# Patient Record
Sex: FEMALE | Race: NATIVE HAWAIIAN OR OTHER PACIFIC ISLANDER | NOT HISPANIC OR LATINO | Employment: OTHER | URBAN - NONMETROPOLITAN AREA
[De-identification: names, ages, dates, MRNs, and addresses within clinical notes are randomized per-mention and may not be internally consistent; named-entity substitution may affect disease eponyms.]

---

## 2017-03-05 PROBLEM — I16.0 HYPERTENSIVE URGENCY: Status: ACTIVE | Noted: 2017-03-05

## 2017-03-05 PROBLEM — J81.0 FLASH PULMONARY EDEMA (HCC): Status: ACTIVE | Noted: 2017-03-05

## 2017-03-05 PROBLEM — R09.02 HYPOXEMIA: Status: ACTIVE | Noted: 2017-03-05

## 2017-05-23 ENCOUNTER — OFFICE VISIT (OUTPATIENT)
Dept: CARDIOLOGY | Facility: CLINIC | Age: 77
End: 2017-05-23
Payer: COMMERCIAL

## 2017-05-23 VITALS
HEART RATE: 95 BPM | BODY MASS INDEX: 22.45 KG/M2 | OXYGEN SATURATION: 95 % | SYSTOLIC BLOOD PRESSURE: 102 MMHG | WEIGHT: 122 LBS | DIASTOLIC BLOOD PRESSURE: 70 MMHG | HEIGHT: 62 IN

## 2017-05-23 DIAGNOSIS — I10 ESSENTIAL HYPERTENSION: ICD-10-CM

## 2017-05-23 DIAGNOSIS — G47.30 SLEEP APNEA, UNSPECIFIED TYPE: ICD-10-CM

## 2017-05-23 DIAGNOSIS — I48.0 PAROXYSMAL ATRIAL FIBRILLATION (HCC): ICD-10-CM

## 2017-05-23 DIAGNOSIS — R00.2 PALPITATIONS: ICD-10-CM

## 2017-05-23 DIAGNOSIS — Z79.01 CHRONIC ANTICOAGULATION: ICD-10-CM

## 2017-05-23 DIAGNOSIS — I65.22 CAROTID OCCLUSION, LEFT: ICD-10-CM

## 2017-05-23 PROCEDURE — 1101F PT FALLS ASSESS-DOCD LE1/YR: CPT | Performed by: INTERNAL MEDICINE

## 2017-05-23 PROCEDURE — 1036F TOBACCO NON-USER: CPT | Performed by: INTERNAL MEDICINE

## 2017-05-23 PROCEDURE — 99214 OFFICE O/P EST MOD 30 MIN: CPT | Performed by: INTERNAL MEDICINE

## 2017-05-23 PROCEDURE — G8432 DEP SCR NOT DOC, RNG: HCPCS | Performed by: INTERNAL MEDICINE

## 2017-05-23 PROCEDURE — 4040F PNEUMOC VAC/ADMIN/RCVD: CPT | Performed by: INTERNAL MEDICINE

## 2017-05-23 PROCEDURE — G8598 ASA/ANTIPLAT THER USED: HCPCS | Performed by: INTERNAL MEDICINE

## 2017-05-23 PROCEDURE — G8420 CALC BMI NORM PARAMETERS: HCPCS | Performed by: INTERNAL MEDICINE

## 2017-05-23 NOTE — MR AVS SNAPSHOT
"Kaia Rea   2017 12:40 PM   Office Visit   MRN: [id number]    Department:  [facility]   Dept Phone:  [phone]    Description:  Female : 1940   Provider:  Adelita Harris MD,[facility]           Reason for Visit     Follow-Up           Allergies as of 2017     Allergen Noted Reactions    Ciprofloxacin 2017   Palpitations    Nausea and dizziness    Seasonal 10/31/2016         You were diagnosed with     Essential hypertension   [5133004]       Paroxysmal atrial fibrillation (CMS-HCC)   [297220]       Chronic anticoagulation   [776667]       Palpitations   [785.1.ICD-9-CM]       Carotid occlusion, left   [007681]       Sleep apnea, unspecified type   [3142671]         Vital Signs     Blood Pressure Pulse Height Weight Body Mass Index Oxygen Saturation    102/70 mmHg 95 1.575 m (5' 2\") 55.339 kg (122 lb) 22.31 kg/m2 95%    Smoking Status                   Former Smoker           Basic Information     Date Of Birth Sex Race Ethnicity Preferred Language    1940 Female  or other  Non- English      Your appointments     2017 10:00 AM   30 Minute with SARI Sanchez   [facility] (--)    [address] [place] [id number] [phone]            Sep 12, 2017  2:20 PM   FOLLOW UP with [facility]elita Harris MD,[facility]    [address] [address]  2nd Floor  [facility] [id number] [phone]              Problem List              ICD-10-CM Priority Class Noted - Resolved    Sleep apnea (Chronic) G47.30   2012 - Present    Hyperlipidemia (Chronic) E78.5   2012 - Present    Chronic low back pain (Chronic) M54.5, G89.29   2012 - Present    Tobacco abuse (Chronic) Z72.0   2012 - Present    Osteopenia (Chronic) M85.80   2012 - Present    Vitamin d deficiency    2012 - Present    History of positive PPD " (Chronic) R76.11   8/22/2012 - Present    Depression F32.9   8/22/2012 - Present    Vaginitis N76.0   9/17/2014 - Present    Atrial fibrillation (CMS-HCC) I48.91 High  12/11/2014 - Present    Palpitations R00.2 High  12/11/2014 - Present    Stroke (CMS-HCC) I63.9   3/16/2016 - Present    Impaired mobility and ADLs Z74.09   3/16/2016 - Present    Aphasia R47.01   3/16/2016 - Present    Benign essential HTN I10   8/10/2016 - Present    Anticoagulated Z79.01   8/10/2016 - Present    Hypoxemia R09.02   3/5/2017 - Present    Flash pulmonary edema (CMS-HCC) J81.0   3/5/2017 - Present    Hypertensive urgency I16.0   3/5/2017 - Present      Health Maintenance        Date Due Completion Dates    IMM DTaP/Tdap/Td Vaccine (1 - Tdap) 8/15/1959 ---    PAP SMEAR 8/15/1961 ---    IMM ZOSTER VACCINE 8/15/2000 ---    IMM PNEUMOCOCCAL 65+ (ADULT) LOW/MEDIUM RISK SERIES (2 of 2 - PCV13) 2/12/2017 2/12/2016, 5/6/2011    MAMMOGRAM 11/14/2017 11/14/2016 (Declined), 10/29/2015, 10/22/2013 (Declined), 7/6/2011 (Done)    Override on 11/14/2016: Patient Declined    Override on 10/22/2013: Patient Declined    Override on 7/6/2011: Done [place] [place]    BONE DENSITY 11/5/2020 11/5/2015, 10/22/2013 (Declined), 7/13/2011 (Done)    Override on 10/22/2013: Patient Declined    Override on 7/13/2011: Done ([facility], Dr Bartlett, osteopenia, no longer osteoporosis)    COLONOSCOPY 10/22/2023 10/22/2013 (Declined), 3/4/2013 (Declined)    Override on 10/22/2013: Patient Declined    Override on 3/4/2013: Patient Declined            Current Immunizations     Influenza TIV (IM) 10/22/2013, 10/27/2011    Influenza Vaccine Adult HD 10/31/2016  2:44 PM    Influenza Vaccine Quad Inj (Preserved) 10/5/2015    Pneumococcal polysaccharide vaccine (PPSV-23) 2/12/2016, 5/6/2011      Below and/or attached are the medications your provider expects you to take. Review all of your home medications and newly ordered medications with your provider and/or pharmacist.  Follow medication instructions as directed by your provider and/or pharmacist. Please keep your medication list with you and share with your provider. Update the information when medications are discontinued, doses are changed, or new medications (including over-the-counter products) are added; and carry medication information at all times in the event of emergency situations     Allergies:  CIPROFLOXACIN - Palpitations     SEASONAL - (reactions not documented)               Medications  Valid as of: May 23, 2017 -  1:03 PM    Generic Name Brand Name Tablet Size Instructions for use    Alendronate Sodium (Tab) FOSAMAX 70 MG Take 1 Tab by mouth every 7 days.        AmLODIPine Besylate (Tab) NORVASC 10 MG Take 1 Tab by mouth every day.        Apixaban (Tab) ELIQUIS 5mg Take 1 Tab by mouth 2 Times a Day.        Aspirin (Tablet Delayed Response) aspirin 81 MG Take 1 Tab by mouth every day.        Ciprofloxacin HCl (Tab) CIPRO 500 MG Take 1 Tab by mouth 2 times a day.        Furosemide (Tab) LASIX 20 MG Take 1 Tab by mouth every day.        Lisinopril (Tab) PRINIVIL, ZESTRIL 40 MG Take 1 Tab by mouth every day.        Metoprolol Tartrate (Tab) LOPRESSOR 25 MG Take 1 Tab by mouth 2 times a day.        Multiple Vitamins-Minerals   Take 1 Tab by mouth.        Pantoprazole Sodium (Tablet Delayed Response) PROTONIX 40 MG TAKE ONE TABLET BY MOUTH DAILY        Potassium Chloride Elisa CR (Tab CR) Kdur 20 MEQ Take 1 Tab by mouth 2 times a day.        Simvastatin (Tab) ZOCOR 5 MG Take 1 Tab by mouth every evening.        Sulfamethoxazole-Trimethoprim (Tab) BACTRIM 400-80 MG Take 1 Tab by mouth 2 times a day.        .                 Medicines prescribed today were sent to:     [facility][id number] - [place], [place] - [address]    [address] [facility] [address]    Phone: [phone] Fax: [phone]    Open 24 Hours?: No      Medication refill instructions:       If your prescription bottle indicates you have  medication refills left, it is not necessary to call your provider’s office. Please contact your pharmacy and they will refill your medication.    If your prescription bottle indicates you do not have any refills left, you may request refills at any time through one of the following ways: The online [employer] system (except Urgent Care), by calling your provider’s office, or by asking your pharmacy to contact your provider’s office with a refill request. Medication refills are processed only during regular business hours and may not be available until the next business day. Your provider may request additional information or to have a follow-up visit with you prior to refilling your medication.   *Please Note: Medication refills are assigned a new Rx number when refilled electronically. Your pharmacy may indicate that no refills were authorized even though a new prescription for the same medication is available at the pharmacy. Please request the medicine by name with the pharmacy before contacting your provider for a refill.           Book[employer] Access Code: Activation code not generated  Current Book[employer] Status: Active

## 2017-05-23 NOTE — Clinical Note
[facility] [address],   2nd Floor  [place] [address]  Phone: [phone]  Fax: [phone]              Kaia Rea  1940    Encounter Date: 5/23/2017    ALEAH Sanchez.    Thank you for the referral. I had the pleasure of seeing Kaia Rea today in cardiology clinic. I've attached my visit note below. If you have any questions please feel free to give me a call anytime.      Adelita Harris MD, PhD, [facility]  Cardiology and Lipidology  [facility]                                                                  PROGRESS NOTE:  Chief Complaint   Patient presents with   • Follow-Up       This patient is an established female who is here today to discuss:  Recent hospitalized for HTN urgency and increased Lisinopril to 40 mg/d; No new compallnts now; Occ palpitation; not certain if still PAF    Patient Active Problem List    Diagnosis Date Noted   • Atrial fibrillation (CMS-HCC) 12/11/2014     Priority: High   • Palpitations 12/11/2014     Priority: High   • Hypoxemia 03/05/2017   • Flash pulmonary edema (CMS-HCC) 03/05/2017   • Hypertensive urgency 03/05/2017   • Benign essential HTN 08/10/2016   • Anticoagulated 08/10/2016   • Stroke (CMS-HCC) 03/16/2016   • Impaired mobility and ADLs 03/16/2016   • Aphasia 03/16/2016   • Vaginitis 09/17/2014   • Sleep apnea 08/22/2012   • Hyperlipidemia 08/22/2012   • Chronic low back pain 08/22/2012   • Tobacco abuse 08/22/2012   • Osteopenia 08/22/2012   • Vitamin d deficiency 08/22/2012   • History of positive PPD 08/22/2012   • Depression 08/22/2012       Past Medical History   Diagnosis Date   • HTN (hypertension) 8/22/2012   • Hyperlipidemia 8/22/2012   • Chronic low back pain 8/22/2012   • Tobacco abuse 8/22/2012   • Osteopenia 8/22/2012   • Vitamin d deficiency 8/22/2012   • History of positive PPD 8/22/2012   • Depression 8/22/2012   •  Hypertension    • Atrial fibrillation (CMS-HCC) 12/11/2014   • Sleep apnea 8/22/2012     No CPAP since 04/2016   • Hemorrhagic disorder      on Eliquis   • Stroke (CMS-HCC) 03/2016   • Left carotid artery occlusion 10/10/2016     Past Surgical History   Procedure Laterality Date   • Appendectomy  1987   • Cholecystectomy  1991   • Abdominal hysterectomy total  1962     partial hysterectomy secondary to prolapse   • Other orthopedic surgery  02/2016     right hip replacement    • Recovery  7/21/2016     Procedure: CATH LAB MISTI WITH ANESTHESIA DR. FOY;  Surgeon: Recoveryonly Surgery;  [place] [facility] [place]:        Current Outpatient Prescriptions   Medication Sig Dispense Refill   • lisinopril (PRINIVIL, ZESTRIL) 40 MG tablet Take 1 Tab by mouth every day. 90 Tab 0   • simvastatin (ZOCOR) 5 MG Tab Take 1 Tab by mouth every evening. 30 Tab 11   • apixaban (ELIQUIS) 5mg Tab Take 1 Tab by mouth 2 Times a Day. 180 Tab 0   • amlodipine (NORVASC) 10 MG Tab Take 1 Tab by mouth every day. 30 Tab 0   • furosemide (LASIX) 20 MG Tab Take 1 Tab by mouth every day. 30 Tab 0   • potassium chloride SA (KDUR) 20 MEQ Tab CR Take 1 Tab by mouth 2 times a day. 30 Tab 0   • Multiple Vitamins-Minerals (MULTI ADULT GUMMIES PO) Take 1 Tab by mouth.     • pantoprazole (PROTONIX) 40 MG Tablet Delayed Response TAKE ONE TABLET BY MOUTH DAILY 90 Tab 3   • alendronate (FOSAMAX) 70 MG Tab Take 1 Tab by mouth every 7 days. 4 Tab 3   • aspirin EC 81 MG EC tablet Take 1 Tab by mouth every day. 30 Tab 0   • sulfamethoxazole-trimethoprim (BACTRIM) 400-80 MG Tab Take 1 Tab by mouth 2 times a day. 6 Tab 0   • ciprofloxacin (CIPRO) 500 MG Tab Take 1 Tab by mouth 2 times a day. 10 Tab 0     No current facility-administered medications for this visit.     Ciprofloxacin and Seasonal      Review of Systems:     Constitutional: Denies fevers, Denies weight changes  Eyes: Denies changes in vision, no eye  "pain  Ears/Nose/Throat/Mouth: Denies nasal congestion or sore throat   Cardiovascular: Denies chest pain BUT palpitations   Respiratory: Denies shortness of breath , Denies cough  Gastrointestinal/Hepatic: Denies abdominal pain, nausea, vomiting, diarrhea, constipation or GI bleeding   Genitourinary: Denies bladder dysfunction, dysuria or frequency  Musculoskeletal/Rheum: Denies  joint pain and swelling   Skin/Breast: Denies rash, denies breast lumps or discharge  Neurological: Denies headache, confusion, memory loss or focal weakness/parasthesias  Psychiatric: denies mood disorder   Endocrine: denies hx of diabetes or thyroid dysfunction  Heme/Oncology/Lymph Nodes: Denies enlarged lymph nodes, denies brusing or known bleeding disorder  Allergic/Immunologic:  hx of allergies      All other systems were reviewed and are negative (AMA/CMS criteria)      Blood pressure 102/70, pulse 95, height 1.575 m (5' 2\"), weight 55.339 kg (122 lb), SpO2 95 %.  Gen: Alert and oriented, No apparent distress.    HEENT: Perrla, TM clear, Oralpharynx no erythema or exudates.    Neck: No Jugular venous distension, Lymphadenopathy, Thyromegaly, Bruits.    Lungs: Clear to auscultation on left lung field and some crackles on Rt    CV: RRegular rate and rhythm. No murmurs,HJR, rubs or gallops.    Abd: Soft non tender, non distended. Normal active bowel sounds.No Hepatosplenomegaly, No pulsatile masses.    Ext: No clubbing, cyanosis, edema.Difficult to walk    MISTI: CONCLUSIONS  No evidence of PFO or ASD by color flow and agitated saline study.  Normal transesophageal echocardiogram.     Assessment and Plan.   76 y.o. female has occ palpitation still scares her a little; sitting up then lay back down seems to help; Long discussion about AF ablation; She wants to try Lopressor first;     1. Essential hypertension  controlled    2. Paroxysmal atrial fibrillation (CMS-HCC)  Still some palpitation    3. Chronic anticoagulation  On Eliquis    4. " Palpitations  See above    5. Carotid occlusion, left  Left    6. Sleep apnea, unspecified type  recheck      1. Essential hypertension     2. Paroxysmal atrial fibrillation (CMS-HCC)     3. Chronic anticoagulation     4. Palpitations     5. Carotid occlusion, left     6. Sleep apnea, unspecified type               Lizzeth Baca, AMRIT.P.R.N.  [address] [facility] [address]  VIA In Basket

## 2017-05-23 NOTE — PROGRESS NOTES
Chief Complaint   Patient presents with   • Follow-Up       This patient is an established female who is here today to discuss:  Recent hospitalized for HTN urgency and increased Lisinopril to 40 mg/d; No new compallnts now; Occ palpitation; not certain if still PAF    Patient Active Problem List    Diagnosis Date Noted   • Atrial fibrillation (CMS-HCC) 12/11/2014     Priority: High   • Palpitations 12/11/2014     Priority: High   • Hypoxemia 03/05/2017   • Flash pulmonary edema (CMS-HCC) 03/05/2017   • Hypertensive urgency 03/05/2017   • Benign essential HTN 08/10/2016   • Anticoagulated 08/10/2016   • Stroke (CMS-HCC) 03/16/2016   • Impaired mobility and ADLs 03/16/2016   • Aphasia 03/16/2016   • Vaginitis 09/17/2014   • Sleep apnea 08/22/2012   • Hyperlipidemia 08/22/2012   • Chronic low back pain 08/22/2012   • Tobacco abuse 08/22/2012   • Osteopenia 08/22/2012   • Vitamin d deficiency 08/22/2012   • History of positive PPD 08/22/2012   • Depression 08/22/2012       Past Medical History   Diagnosis Date   • HTN (hypertension) 8/22/2012   • Hyperlipidemia 8/22/2012   • Chronic low back pain 8/22/2012   • Tobacco abuse 8/22/2012   • Osteopenia 8/22/2012   • Vitamin d deficiency 8/22/2012   • History of positive PPD 8/22/2012   • Depression 8/22/2012   • Hypertension    • Atrial fibrillation (CMS-HCC) 12/11/2014   • Sleep apnea 8/22/2012     No CPAP since 04/2016   • Hemorrhagic disorder      on Eliquis   • Stroke (CMS-HCC) 03/2016   • Left carotid artery occlusion 10/10/2016     Past Surgical History   Procedure Laterality Date   • Appendectomy  1987   • Cholecystectomy  1991   • Abdominal hysterectomy total  1962     partial hysterectomy secondary to prolapse   • Other orthopedic surgery  02/2016     right hip replacement    • Recovery  7/21/2016     Procedure: CATH LAB MISTI WITH ANESTHESIA DR. FOY;  Surgeon: Recoveryonly Surgery;  [place] [facility] [place]  Service:        Current Outpatient  Prescriptions   Medication Sig Dispense Refill   • lisinopril (PRINIVIL, ZESTRIL) 40 MG tablet Take 1 Tab by mouth every day. 90 Tab 0   • simvastatin (ZOCOR) 5 MG Tab Take 1 Tab by mouth every evening. 30 Tab 11   • apixaban (ELIQUIS) 5mg Tab Take 1 Tab by mouth 2 Times a Day. 180 Tab 0   • amlodipine (NORVASC) 10 MG Tab Take 1 Tab by mouth every day. 30 Tab 0   • furosemide (LASIX) 20 MG Tab Take 1 Tab by mouth every day. 30 Tab 0   • potassium chloride SA (KDUR) 20 MEQ Tab CR Take 1 Tab by mouth 2 times a day. 30 Tab 0   • Multiple Vitamins-Minerals (MULTI ADULT GUMMIES PO) Take 1 Tab by mouth.     • pantoprazole (PROTONIX) 40 MG Tablet Delayed Response TAKE ONE TABLET BY MOUTH DAILY 90 Tab 3   • alendronate (FOSAMAX) 70 MG Tab Take 1 Tab by mouth every 7 days. 4 Tab 3   • aspirin EC 81 MG EC tablet Take 1 Tab by mouth every day. 30 Tab 0   • sulfamethoxazole-trimethoprim (BACTRIM) 400-80 MG Tab Take 1 Tab by mouth 2 times a day. 6 Tab 0   • ciprofloxacin (CIPRO) 500 MG Tab Take 1 Tab by mouth 2 times a day. 10 Tab 0     No current facility-administered medications for this visit.     Ciprofloxacin and Seasonal      Review of Systems:     Constitutional: Denies fevers, Denies weight changes  Eyes: Denies changes in vision, no eye pain  Ears/Nose/Throat/Mouth: Denies nasal congestion or sore throat   Cardiovascular: Denies chest pain BUT palpitations   Respiratory: Denies shortness of breath , Denies cough  Gastrointestinal/Hepatic: Denies abdominal pain, nausea, vomiting, diarrhea, constipation or GI bleeding   Genitourinary: Denies bladder dysfunction, dysuria or frequency  Musculoskeletal/Rheum: Denies  joint pain and swelling   Skin/Breast: Denies rash, denies breast lumps or discharge  Neurological: Denies headache, confusion, memory loss or focal weakness/parasthesias  Psychiatric: denies mood disorder   Endocrine: denies hx of diabetes or thyroid dysfunction  Heme/Oncology/Lymph Nodes: Denies enlarged lymph  "nodes, denies brusing or known bleeding disorder  Allergic/Immunologic:  hx of allergies      All other systems were reviewed and are negative (AMA/CMS criteria)      Blood pressure 102/70, pulse 95, height 1.575 m (5' 2\"), weight 55.339 kg (122 lb), SpO2 95 %.  Gen: Alert and oriented, No apparent distress.    HEENT: Perrla, TM clear, Oralpharynx no erythema or exudates.    Neck: No Jugular venous distension, Lymphadenopathy, Thyromegaly, Bruits.    Lungs: Clear to auscultation on left lung field and some crackles on Rt    CV: RRegular rate and rhythm. No murmurs,HJR, rubs or gallops.    Abd: Soft non tender, non distended. Normal active bowel sounds.No Hepatosplenomegaly, No pulsatile masses.    Ext: No clubbing, cyanosis, edema.Difficult to walk    MISTI: CONCLUSIONS  No evidence of PFO or ASD by color flow and agitated saline study.  Normal transesophageal echocardiogram.     Assessment and Plan.   76 y.o. female has occ palpitation still scares her a little; sitting up then lay back down seems to help; Long discussion about AF ablation; She wants to try Lopressor first;   She has h/o CVA and will need to use DOAC, discussed;    1. Essential hypertension  controlled    2. Paroxysmal atrial fibrillation (CMS-HCC)  Still some palpitation    3. Chronic anticoagulation  On Eliquis    4. Palpitations  See above    5. Carotid occlusion, left  Left    6. Sleep apnea, unspecified type  recheck      1. Essential hypertension     2. Paroxysmal atrial fibrillation (CMS-HCC)     3. Chronic anticoagulation     4. Palpitations     5. Carotid occlusion, left     6. Sleep apnea, unspecified type           "

## 2017-06-06 ENCOUNTER — TELEPHONE (OUTPATIENT)
Dept: CARDIOLOGY | Facility: MEDICAL CENTER | Age: 77
End: 2017-06-06

## 2017-06-06 DIAGNOSIS — I48.0 PAROXYSMAL ATRIAL FIBRILLATION (HCC): ICD-10-CM

## 2017-06-06 NOTE — TELEPHONE ENCOUNTER
Adelita Harris MD,[facility]  Ana Lilia Edouard R.N.        Caller: Unspecified (Today, 11:02 AM)       Good job, Thx       Pt to continue with taking 25mg Metoprolol BID

## 2017-06-06 NOTE — TELEPHONE ENCOUNTER
----- Message from Makenna Gutierrez sent at 6/6/2017  9:42 AM PDT -----  Regarding: Patient is increasing dosage on Metoprolol  CEDRIC/Ana Lilia    Patient has been taking 12.5 mg, twice a day of Metoprolol and feels like it's working for her. She's going to increase the dose to 25 mg and can be reached at [phone].

## 2017-06-06 NOTE — TELEPHONE ENCOUNTER
Pt reports that she started to take metoprolol 25mg BID and states that is has made her feel better at night. Denies any dizziness or fatigue. Last recorded vitals: BP-124/81 HR56. Educated pt to please continue to monitor BP and HR.     To Dr. Harris, OK with pt to continue to take 25 mg Metoprolol BID?

## 2017-06-08 NOTE — TELEPHONE ENCOUNTER
Called patient and advised her of Dr. Harris's instructions. Patient verbalized understanding and has no further questions at this time.    Rx for Metoprolol 25 mg BID (90-day supply) sent to [facility] per patient's request.    CALEB LEON

## 2017-09-19 ENCOUNTER — OFFICE VISIT (OUTPATIENT)
Dept: CARDIOLOGY | Facility: CLINIC | Age: 77
End: 2017-09-19
Payer: COMMERCIAL

## 2017-09-19 VITALS
HEIGHT: 62 IN | DIASTOLIC BLOOD PRESSURE: 78 MMHG | OXYGEN SATURATION: 98 % | SYSTOLIC BLOOD PRESSURE: 120 MMHG | BODY MASS INDEX: 22.63 KG/M2 | HEART RATE: 56 BPM | WEIGHT: 123 LBS

## 2017-09-19 DIAGNOSIS — I65.22 CAROTID OCCLUSION, LEFT: ICD-10-CM

## 2017-09-19 DIAGNOSIS — I48.0 PAROXYSMAL ATRIAL FIBRILLATION (HCC): ICD-10-CM

## 2017-09-19 DIAGNOSIS — R00.2 PALPITATIONS: ICD-10-CM

## 2017-09-19 DIAGNOSIS — I10 ESSENTIAL HYPERTENSION: ICD-10-CM

## 2017-09-19 DIAGNOSIS — Z79.01 CHRONIC ANTICOAGULATION: ICD-10-CM

## 2017-09-19 DIAGNOSIS — G47.30 SLEEP APNEA, UNSPECIFIED TYPE: ICD-10-CM

## 2017-09-19 PROCEDURE — 99214 OFFICE O/P EST MOD 30 MIN: CPT | Performed by: INTERNAL MEDICINE

## 2017-09-19 NOTE — LETTER
[facility] [address],   [address]nd [address]  [place] [address]  Phone: [phone]  Fax: [phone]              Kaia Rea  1940    Encounter Date: 9/19/2017    ALEAH Sanchez.    Thank you for the referral. I had the pleasure of seeing Kaia Rea today in cardiology clinic. I've attached my visit note below. If you have any questions please feel free to give me a call anytime.      Adelita Harris MD, PhD, [facility]  Cardiology and Lipidology  [facility]                                                                  PROGRESS NOTE:  Chief Complaint   Patient presents with   • Follow-Up     PAF. Palpitation;     This patient is an established female who is here today to discuss:  Sleeps good now; Occ palpitation;     Patient Active Problem List    Diagnosis Date Noted   • Atrial fibrillation (CMS-HCC) 12/11/2014     Priority: High   • Palpitations 12/11/2014     Priority: High   • Hypoxemia 03/05/2017   • Flash pulmonary edema (CMS-HCC) 03/05/2017   • Hypertensive urgency 03/05/2017   • Benign essential HTN 08/10/2016   • Anticoagulated 08/10/2016   • Stroke (CMS-HCC) 03/16/2016   • Impaired mobility and ADLs 03/16/2016   • Aphasia 03/16/2016   • Vaginitis 09/17/2014   • Sleep apnea 08/22/2012   • Hyperlipidemia 08/22/2012   • Chronic low back pain 08/22/2012   • Former tobacco use 08/22/2012   • Osteopenia 08/22/2012   • Vitamin d deficiency 08/22/2012   • History of positive PPD 08/22/2012   • Depression 08/22/2012       Past Medical History:   Diagnosis Date   • Left carotid artery occlusion 10/10/2016   • Stroke (CMS-HCC) 03/2016   • Atrial fibrillation (CMS-HCC) 12/11/2014   • HTN (hypertension) 8/22/2012   • Hyperlipidemia 8/22/2012   • Chronic low back pain 8/22/2012   • Tobacco abuse 8/22/2012   • Osteopenia 8/22/2012   • Vitamin d deficiency 8/22/2012   • History of positive  PPD 8/22/2012   • Depression 8/22/2012   • Sleep apnea 8/22/2012    No CPAP since 04/2016   • Hemorrhagic disorder     on Eliquis   • Hypertension      Past Surgical History:   Procedure Laterality Date   • RECOVERY  7/21/2016    Procedure: CATH LAB MISTI WITH ANESTHESIA DR. FOY;  Surgeon: Ismael Surgery;  Location[place] SURG[place] PRE[place] [facility] [place]  Service:    • OTHER ORTHOPEDIC SURGERY  02/2016    right hip replacement    • CHOLECYSTECTOMY  1991   • APPENDECTOMY  1987   • ABDOMINAL HYSTERECTOMY TOTAL  1962    partial hysterectomy secondary to prolapse     Social History     Social History   • Marital status:      Spouse name: N/A   • Number of children: N/A   • Years of education: N/A     Occupational History   • Retired CNA      Social History Main Topics   • Smoking status: Former Smoker     Packs/day: 0.50     Years: 3.00     Types: Cigarettes     Quit date: 10/20/2014   • Smokeless tobacco: Never Used   • Alcohol use No   • Drug use: No   • Sexual activity: Not on file      Comment:      Other Topics Concern   • Not on file     Social History Narrative     living with .     Family History   Problem Relation Age of Onset   • Other Mother      unknown reasons   • Non-contributory Father 60     MVA, otherwise healthy   • Diabetes Sister    • Other Sister      obesity   • Cancer Sister      Leukemia   • Cancer Brother      brain tumor       Current Outpatient Prescriptions   Medication Sig Dispense Refill   • B Complex Vitamins (B COMPLEX PO) Take  by mouth.     • Cholecalciferol (D3 ADULT PO) Take  by mouth.     • Omega-3 1400 MG Cap Take  by mouth.     • ELIQUIS 5 MG Tab TAKE ONE TABLET BY MOUTH TWICE A DAY 60 Tab 0   • metoprolol (LOPRESSOR) 25 MG Tab Take 1 Tab by mouth 2 times a day. 180 Tab 3   • lisinopril (PRINIVIL, ZESTRIL) 40 MG tablet Take 1 Tab by mouth every day. 90 Tab 0   • simvastatin (ZOCOR) 5 MG Tab Take 1 Tab by mouth every evening. 30 Tab 11   • pantoprazole  "(PROTONIX) 40 MG Tablet Delayed Response TAKE ONE TABLET BY MOUTH DAILY 90 Tab 3   • aspirin EC 81 MG EC tablet Take 1 Tab by mouth every day. 30 Tab 0   • fluconazole (DIFLUCAN) 150 MG tablet Take 1 Tab by mouth every day. 1 Tab 1   • alendronate (FOSAMAX) 70 MG Tab Take 1 Tab by mouth every 7 days. 12 Tab 0   • amlodipine (NORVASC) 10 MG Tab Take 1 Tab by mouth every day. 30 Tab 0   • furosemide (LASIX) 20 MG Tab Take 1 Tab by mouth every day. 30 Tab 0   • potassium chloride SA (KDUR) 20 MEQ Tab CR Take 1 Tab by mouth 2 times a day. 30 Tab 0   • Multiple Vitamins-Minerals (MULTI ADULT GUMMIES PO) Take 1 Tab by mouth.       No current facility-administered medications for this visit.      Ciprofloxacin and Seasonal    Review of Systems:     Constitutional: Denies fevers, Denies weight changes  Eyes: Denies changes in vision, no eye pain  Ears/Nose/Throat/Mouth: Denies nasal congestion or sore throat   Cardiovascular: Denies chest pain but palpitations   Respiratory: Denies shortness of breath , Denies cough  Gastrointestinal/Hepatic: Denies abdominal pain, nausea, vomiting, diarrhea, constipation or GI bleeding   Genitourinary: Denies bladder dysfunction, dysuria or frequency  Musculoskeletal/Rheum: Denies  joint pain and swelling   Skin/Breast: Denies rash, denies breast lumps or discharge  Neurological: Denies headache, confusion, memory loss or focal weakness/parasthesias  Psychiatric: denies mood disorder   Endocrine: denies hx of diabetes or thyroid dysfunction  Heme/Oncology/Lymph Nodes: Denies enlarged lymph nodes, denies brusing or known bleeding disorder  Allergic/Immunologic: Denies hx of allergies      All other systems were reviewed and are negative (AMA/CMS criteria)      Blood pressure 120/78, pulse (!) 56, height 1.575 m (5' 2\"), weight 55.8 kg (123 lb), SpO2 98 %.  Gen: Alert and oriented, No apparent distress.    HEENT: Perrla, TM clear, Oralpharynx no erythema or exudates.    Neck: No Jugular " venous distension, Lymphadenopathy, Thyromegaly, Bruits.    Lungs: Clear to auscultation on left lung field and some crackles on Rt    CV: RRegular rate and rhythm. No murmurs,HJR, rubs or gallops.    Abd: Soft non tender, non distended. Normal active bowel sounds.No Hepatosplenomegaly, No pulsatile masses.    Ext: No clubbing, cyanosis, edema.Difficult to walk     MISTI: CONCLUSIONS  No evidence of PFO or ASD by color flow and agitated saline study.  Normal transesophageal echocardiogram.       Assessment and Plan.   77 y.o. female on Eliquis; PAF with palpitation but largely controlled;     1. Paroxysmal atrial fibrillation (CMS-HCC)  Rate controlled and rare    2. Chronic anticoagulation  Eliquis    3. Palpitations  rare    4. Essential hypertension  controlled    5. Carotid occlusion, left  Followed by [facility]ascular; [facility]    6. Sleep apnea, unspecified type  No CPAP; less sx's      Return to clinic in  6 , months    1. Paroxysmal atrial fibrillation (CMS-HCC)     2. Chronic anticoagulation     3. Palpitations     4. Essential hypertension     5. Carotid occlusion, left     6. Sleep apnea, unspecified type           Lizzeth Baca, A.P.R.N.  [address] [facility] [address]  VIA In Basket

## 2017-09-19 NOTE — PROGRESS NOTES
Chief Complaint   Patient presents with   • Follow-Up     PAF. Palpitation;     This patient is an established female who is here today to discuss:  Sleeps good now; Occ palpitation;     Patient Active Problem List    Diagnosis Date Noted   • Atrial fibrillation (CMS-HCC) 12/11/2014     Priority: High   • Palpitations 12/11/2014     Priority: High   • Hypoxemia 03/05/2017   • Flash pulmonary edema (CMS-HCC) 03/05/2017   • Hypertensive urgency 03/05/2017   • Benign essential HTN 08/10/2016   • Anticoagulated 08/10/2016   • Stroke (CMS-HCC) 03/16/2016   • Impaired mobility and ADLs 03/16/2016   • Aphasia 03/16/2016   • Vaginitis 09/17/2014   • Sleep apnea 08/22/2012   • Hyperlipidemia 08/22/2012   • Chronic low back pain 08/22/2012   • Former tobacco use 08/22/2012   • Osteopenia 08/22/2012   • Vitamin d deficiency 08/22/2012   • History of positive PPD 08/22/2012   • Depression 08/22/2012       Past Medical History:   Diagnosis Date   • Left carotid artery occlusion 10/10/2016   • Stroke (CMS-HCC) 03/2016   • Atrial fibrillation (CMS-HCC) 12/11/2014   • HTN (hypertension) 8/22/2012   • Hyperlipidemia 8/22/2012   • Chronic low back pain 8/22/2012   • Tobacco abuse 8/22/2012   • Osteopenia 8/22/2012   • Vitamin d deficiency 8/22/2012   • History of positive PPD 8/22/2012   • Depression 8/22/2012   • Sleep apnea 8/22/2012    No CPAP since 04/2016   • Hemorrhagic disorder     on Eliquis   • Hypertension      Past Surgical History:   Procedure Laterality Date   • RECOVERY  7/21/2016    Procedure: CATH LAB MISTI WITH ANESTHESIA DR. FOY;  Surgeon: [facility];  Location: SURG[place] PRE-POST PROC UNIT [facility] [place];  Service:    • OTHER ORTHOPEDIC SURGERY  02/2016    right hip replacement    • CHOLECYSTECTOMY  1991   • APPENDECTOMY  1987   • ABDOMINAL HYSTERECTOMY TOTAL  1962    partial hysterectomy secondary to prolapse     Social History     Social History   • Marital status:      Spouse name: N/A   • Number of  children: N/A   • Years of education: N/A     Occupational History   • Retired CNA      Social History Main Topics   • Smoking status: Former Smoker     Packs/day: 0.50     Years: 3.00     Types: Cigarettes     Quit date: 10/20/2014   • Smokeless tobacco: Never Used   • Alcohol use No   • Drug use: No   • Sexual activity: Not on file      Comment:      Other Topics Concern   • Not on file     Social History Narrative     living with .     Family History   Problem Relation Age of Onset   • Other Mother      unknown reasons   • Non-contributory Father 60     MVA, otherwise healthy   • Diabetes Sister    • Other Sister      obesity   • Cancer Sister      Leukemia   • Cancer Brother      brain tumor       Current Outpatient Prescriptions   Medication Sig Dispense Refill   • B Complex Vitamins (B COMPLEX PO) Take  by mouth.     • Cholecalciferol (D3 ADULT PO) Take  by mouth.     • Omega-3 1400 MG Cap Take  by mouth.     • ELIQUIS 5 MG Tab TAKE ONE TABLET BY MOUTH TWICE A DAY 60 Tab 0   • metoprolol (LOPRESSOR) 25 MG Tab Take 1 Tab by mouth 2 times a day. 180 Tab 3   • lisinopril (PRINIVIL, ZESTRIL) 40 MG tablet Take 1 Tab by mouth every day. 90 Tab 0   • simvastatin (ZOCOR) 5 MG Tab Take 1 Tab by mouth every evening. 30 Tab 11   • pantoprazole (PROTONIX) 40 MG Tablet Delayed Response TAKE ONE TABLET BY MOUTH DAILY 90 Tab 3   • aspirin EC 81 MG EC tablet Take 1 Tab by mouth every day. 30 Tab 0   • fluconazole (DIFLUCAN) 150 MG tablet Take 1 Tab by mouth every day. 1 Tab 1   • alendronate (FOSAMAX) 70 MG Tab Take 1 Tab by mouth every 7 days. 12 Tab 0   • amlodipine (NORVASC) 10 MG Tab Take 1 Tab by mouth every day. 30 Tab 0   • furosemide (LASIX) 20 MG Tab Take 1 Tab by mouth every day. 30 Tab 0   • potassium chloride SA (KDUR) 20 MEQ Tab CR Take 1 Tab by mouth 2 times a day. 30 Tab 0   • Multiple Vitamins-Minerals (MULTI ADULT GUMMIES PO) Take 1 Tab by mouth.       No current facility-administered  "medications for this visit.      Ciprofloxacin and Seasonal    Review of Systems:     Constitutional: Denies fevers, Denies weight changes  Eyes: Denies changes in vision, no eye pain  Ears/Nose/Throat/Mouth: Denies nasal congestion or sore throat   Cardiovascular: Denies chest pain but palpitations   Respiratory: Denies shortness of breath , Denies cough  Gastrointestinal/Hepatic: Denies abdominal pain, nausea, vomiting, diarrhea, constipation or GI bleeding   Genitourinary: Denies bladder dysfunction, dysuria or frequency  Musculoskeletal/Rheum: Denies  joint pain and swelling   Skin/Breast: Denies rash, denies breast lumps or discharge  Neurological: Denies headache, confusion, memory loss or focal weakness/parasthesias  Psychiatric: denies mood disorder   Endocrine: denies hx of diabetes or thyroid dysfunction  Heme/Oncology/Lymph Nodes: Denies enlarged lymph nodes, denies brusing or known bleeding disorder  Allergic/Immunologic: Denies hx of allergies      All other systems were reviewed and are negative (AMA/CMS criteria)      Blood pressure 120/78, pulse (!) 56, height 1.575 m (5' 2\"), weight 55.8 kg (123 lb), SpO2 98 %.  Gen: Alert and oriented, No apparent distress.    HEENT: Perrla, TM clear, Oralpharynx no erythema or exudates.    Neck: No Jugular venous distension, Lymphadenopathy, Thyromegaly, Bruits.    Lungs: Clear to auscultation on left lung field and some crackles on Rt    CV: RRegular rate and rhythm. No murmurs,HJR, rubs or gallops.    Abd: Soft non tender, non distended. Normal active bowel sounds.No Hepatosplenomegaly, No pulsatile masses.    Ext: No clubbing, cyanosis, edema.Difficult to walk     MISTI: CONCLUSIONS  No evidence of PFO or ASD by color flow and agitated saline study.  Normal transesophageal echocardiogram.       Assessment and Plan.   77 y.o. female on Eliquis; PAF with palpitation but largely controlled;     1. Paroxysmal atrial fibrillation (CMS-HCC)  Rate controlled and " rare    2. Chronic anticoagulation  Eliquis    3. Palpitations  rare    4. Essential hypertension  controlled    5. Carotid occlusion, left  Followed by [facility]ascular; [facility]    6. Sleep apnea, unspecified type  No CPAP; less sx's      Return to clinic in  6 , months    1. Paroxysmal atrial fibrillation (CMS-HCC)     2. Chronic anticoagulation     3. Palpitations     4. Essential hypertension     5. Carotid occlusion, left     6. Sleep apnea, unspecified type

## 2017-11-14 ENCOUNTER — TELEPHONE (OUTPATIENT)
Dept: CARDIOLOGY | Facility: MEDICAL CENTER | Age: 77
End: 2017-11-14

## 2017-11-14 NOTE — TELEPHONE ENCOUNTER
Rec'd fax from [facility]. Pt scheduled for removal lipoma upper back on 11/21 under general anesthesia. Takes Eliquis. To Dr. Harris to advise on risk & stopping Eliquis.      (fax: [phone],  [phone] Attn: Johanne)

## 2017-11-14 NOTE — LETTER
PROCEDURE/SURGERY CLEARANCE FORM      Encounter Date: 11/14/2017    Patient: Kaia Rea  YOB: 1940    CARDIOLOGIST: Dr. Harris  REFERRING DOCTOR:  [facility]            F: [phone], Attn: Johanne    The above patient may hold Eliquis 2-5 days prior to surgery.    Please contact my office with any questions.                 Adelita Harris M.D.

## 2017-11-15 NOTE — TELEPHONE ENCOUNTER
To CAROLYN Rivas MD,FACC  Blanca Copeland R.N.   Caller: Unspecified (Yesterday,  3:59 PM)             Off NOAC for 2-5 days before surg; Thx

## 2018-06-11 DIAGNOSIS — I48.0 PAROXYSMAL ATRIAL FIBRILLATION (HCC): ICD-10-CM

## 2018-09-10 DIAGNOSIS — I48.0 PAROXYSMAL ATRIAL FIBRILLATION (HCC): ICD-10-CM

## 2018-09-13 ENCOUNTER — OFFICE VISIT (OUTPATIENT)
Dept: CARDIOLOGY | Facility: CLINIC | Age: 78
End: 2018-09-13
Payer: COMMERCIAL

## 2018-09-13 VITALS
SYSTOLIC BLOOD PRESSURE: 100 MMHG | OXYGEN SATURATION: 95 % | WEIGHT: 132 LBS | HEART RATE: 90 BPM | BODY MASS INDEX: 24.29 KG/M2 | HEIGHT: 62 IN | DIASTOLIC BLOOD PRESSURE: 70 MMHG

## 2018-09-13 DIAGNOSIS — I77.1 STENOSIS OF RIGHT SUBCLAVIAN ARTERY (HCC): Chronic | ICD-10-CM

## 2018-09-13 DIAGNOSIS — Z79.01 ANTICOAGULATED: ICD-10-CM

## 2018-09-13 DIAGNOSIS — Z87.891 FORMER TOBACCO USE: ICD-10-CM

## 2018-09-13 DIAGNOSIS — E78.5 DYSLIPIDEMIA: ICD-10-CM

## 2018-09-13 DIAGNOSIS — I48.20 CHRONIC ATRIAL FIBRILLATION (HCC): ICD-10-CM

## 2018-09-13 DIAGNOSIS — I63.89 CEREBROVASCULAR ACCIDENT (CVA) DUE TO OTHER MECHANISM (HCC): ICD-10-CM

## 2018-09-13 DIAGNOSIS — I48.0 PAROXYSMAL ATRIAL FIBRILLATION (HCC): ICD-10-CM

## 2018-09-13 DIAGNOSIS — I65.23 BILATERAL CAROTID ARTERY STENOSIS: Chronic | ICD-10-CM

## 2018-09-13 PROCEDURE — 99214 OFFICE O/P EST MOD 30 MIN: CPT | Performed by: INTERNAL MEDICINE

## 2018-09-13 PROCEDURE — 93000 ELECTROCARDIOGRAM COMPLETE: CPT | Performed by: INTERNAL MEDICINE

## 2018-09-13 RX ORDER — SIMVASTATIN 20 MG
20 TABLET ORAL EVERY EVENING
Qty: 90 TAB | Refills: 3 | Status: SHIPPED | OUTPATIENT
Start: 2018-09-13 | End: 2019-11-18 | Stop reason: SDUPTHER

## 2018-09-13 ASSESSMENT — ENCOUNTER SYMPTOMS
FOCAL WEAKNESS: 0
CLAUDICATION: 0
PALPITATIONS: 0
WEAKNESS: 0
SORE THROAT: 0
DIZZINESS: 0
CHILLS: 0
BLURRED VISION: 0
FALLS: 0
FEVER: 0
COUGH: 0
PND: 0
ABDOMINAL PAIN: 0
SHORTNESS OF BREATH: 0
NAUSEA: 0
BRUISES/BLEEDS EASILY: 0

## 2018-09-13 NOTE — PATIENT INSTRUCTIONS
Please look into the following diets and incorporate them into your diet    FOR TREATMENT OR PREVENTION OF CORONARY ARTERY DISEASE    Jennifer - [facility]    Dr Atkinson - Sherry over [place] (book and documentary)    Dr Kevyn [facility]'s Cardiologist    FOR TREATMENT OF BLOOD PRESSURE  DASH DIET - American Heart Association for treatment of HYPERTENSION    KEEP YOUR SODIUM EQUAL TO CALORIES AND NO MORE THAN DOUBLE THE CALORIES FOR A LOW SALT DIET    FOR TREATMENT OF BAD CHOLESTEROL/FATS  REDUCE PROCESSED SUGAR AS MUCH AS POSSIBLE  INCREASE WHOLE GRAINS/VEGETABLES    Lowering total cholesterol and LDL (bad) cholesterol:  - Eat leaner cuts of meat, or eliminate altogether if possible red meat, and frequently substitute fish or chicken.  - Limit saturated fat to no more than 7-10% of total calories no more than 10 g per day is recommended. Some sources of saturated fat include butter, animal fats, hydrogenated vegetable fats and oils, many desserts, whole milk dairy products.  - Replaced saturated fats with polyunsaturated fats and monounsaturated fats. Foods high in monounsaturated fat include nuts, although well, canola oil, avocados, and olives.  - Limit trans fat (processed foods) and replaced with fresh fruits and vegetables  - Recommend nonfat dairy products  - Increase substantially the amount of soluble fiber intake (legumes such as beans, fruit, whole grains).  - Consider nutritional supplements: plant sterile spreads such as Benecol, fish oil,  flaxseed oil, omega-3 acids capsules 1000 mg twice a day, or viscous fiber such as Metamucil  - Attain ideal weight and regular exercise (at least 30 minutes per day of walking)    Lowering triglycerides:  - Reduce intake of simple sugar: Desserts, candy, pastries, honey, sodas, sugared cereals, yogurt, Gatorade, sports bars, canned fruit, smoothies, fruit juice, coffee drinks  - Reduced intake of refined starches: Refined Pasta  - Reduce or  abstain from alcohol  - Increase omega-3 fatty acids: [place], Trout, Mackerel, Herring, [place] tuna and supplements  - Attain ideal weight and regular exercise (at least 30 minutes per day of walking)      Elevating HDL (good) cholesterol:  - Increase physical activity  - Seasoned foods with garlic and onions  - Increase omega-3 fatty acids and supplements as listed above  - Incorporating appropriate amounts of monounsaturated fats such as nuts, olive oil, canola oil, avocados, olives  - Stop smoking  - Attain ideal weight and regular exercise (at least 30 minutes per day of walking)

## 2018-09-13 NOTE — LETTER
[facility] [place] [address],   2nd [address]  [place] [address]  Phone: [phone]  Fax: [phone]              Kaia Rea  1940    Encounter Date: 9/13/2018    Patrice Motta M.D.          PROGRESS NOTE:  Chief Complaint   Patient presents with   • HTN (Controlled)       Subjective:   Kaia Rea is a 78 y.o. female who presents today for follow-up of paroxysmal atrial fibrillation mild carotid disease but likely severe stenosis of the right subclavian    She has been doing okay she has had some confusion about her overall medical history she was informed that she had a prior occlusion of her left carotid but based on the reports this is only mildly stenosed she does have a right subclavian stenosis that was seen prior as well she does follow-up regularly with vascular    She is doing okay on chronic anticoagulation    Past Medical History:   Diagnosis Date   • Atrial fibrillation (HCC) 12/11/2014    on Eliquis for proximal A Fib   • Bilateral carotid artery stenosis    • Bleeding tendency (HCC)     due to Eliquis   • Chronic low back pain 8/22/2012   • Dental disorder     removable lower front partial   • Depression 8/22/2012   • History of positive PPD 8/22/2012   • Hyperlipidemia 8/22/2012   • Hypertension     on Amlodipine Lisinopril Metoprolol   • Kidney calculi     x 1   • Left carotid artery occlusion 10/10/2016   • Osteopenia 8/22/2012   • Sleep apnea 08/22/2012    No CPAP since 04/2016  had wt loss of 50 lbs   • Stenosis of right subclavian artery (HCC)    • Stroke ([facility]) 03/2016    mild- had some memory loss   • Vitamin d deficiency 8/22/2012     Past Surgical History:   Procedure Laterality Date   • LIPOMA EXCISION Right 11/21/2017    Procedure: EXCISION LIPOMA RIGHT SHOUDLER/BACK;  Surgeon: Zackary Pacheco M.D.;  Location: SURGERY [facility];  Service: Gen Robotic   • RECOVERY  7/21/2016    Procedure:  CATH LAB MISTI WITH ANESTHESIA DR. FOY;  Surgeon: [facility];  [place] [facility] [place]  Service:    • HIP REPLACEMENT, TOTAL Right 02/2016   • CHOLECYSTECTOMY  1991   • APPENDECTOMY  1987   • ABDOMINAL HYSTERECTOMY TOTAL  1962    partial hysterectomy secondary to prolapse   • CATARACT PHACO WITH IOL Left    • HAMMERTOE CORRECTION Left      Family History   Problem Relation Age of Onset   • Other Mother         unknown reasons   • Non-contributory Father 60        MVA, otherwise healthy   • Diabetes Sister    • Other Sister         obesity   • Cancer Sister         Leukemia   • Cancer Brother         brain tumor     Social History     Social History   • Marital status:      Spouse name: N/A   • Number of children: N/A   • Years of education: N/A     Occupational History   • Retired CNA      Social History Main Topics   • Smoking status: Former Smoker     Packs/day: 0.50     Years: 3.00     Types: Cigarettes     Quit date: 10/20/2014   • Smokeless tobacco: Never Used   • Alcohol use 0.0 oz/week      Comment: rare   • Drug use: No   • Sexual activity: Not on file      Comment:      Other Topics Concern   • Not on file     Social History Narrative     living with .     Allergies   Allergen Reactions   • Ciprofloxacin Palpitations     Nausea and dizziness   • Oxycodone-Acetaminophen Itching   • Seasonal      Outpatient Encounter Prescriptions as of 9/13/2018   Medication Sig Dispense Refill   • simvastatin (ZOCOR) 20 MG Tab Take 1 Tab by mouth every evening. 90 Tab 3   • nitrofurantoin (MACRODANTIN) 50 MG Cap Take 1 Cap by mouth 4 times a day. 20 Cap 0   • metoprolol (LOPRESSOR) 25 MG Tab Take 1 Tab by mouth 2 times a day. For further refills please contact new cardiologist. Thank you 180 Tab 0   • lisinopril (PRINIVIL, ZESTRIL) 40 MG tablet TAKE ONE TABLET BY MOUTH DAILY 90 Tab 0   • ELIQUIS 5 MG Tab TAKE ONE TABLET BY MOUTH TWICE A DAY 60 Tab 5   • traZODone  "(DESYREL) 50 MG Tab TAKE ONE TABLET BY MOUTH AT BEDTIME AS NEEDED FOR SLEEP 30 Tab 4   • pantoprazole (PROTONIX) 40 MG Tablet Delayed Response TAKE ONE TABLET BY MOUTH DAILY 90 Tab 1   • amLODIPine (NORVASC) 5 MG Tab TAKE ONE TABLET BY MOUTH DAILY 90 Tab 1   • ondansetron (ZOFRAN ODT) 4 MG TABLET DISPERSIBLE Take 1-2 Tabs by mouth every 8 hours as needed. 12 Tab 1   • Probiotic Product (PROBIOTIC DAILY PO) Take  by mouth.     • B Complex Vitamins (B COMPLEX PO) Take  by mouth.     • Cholecalciferol (D3 ADULT PO) Take  by mouth.     • Omega-3 1400 MG Cap Take  by mouth.     • Multiple Vitamins-Minerals (MULTI ADULT GUMMIES PO) Take 1 Tab by mouth.     • fluconazole (DIFLUCAN) 100 MG Tab Take 1 Tab by mouth every day. 5 Tab 0   • fluconazole (DIFLUCAN) 150 MG tablet Take 1 Tab by mouth every day. 1 Tab 0   • [DISCONTINUED] simvastatin (ZOCOR) 5 MG Tab TAKE ONE TABLET BY MOUTH EVERY EVENING 90 Tab 2   • alendronate (FOSAMAX) 70 MG Tab TAKE 1 TABLET BY MOUTH ONCE EVERY 7 DAYS BEFORE BREAKFAST, ON AN EMPTY STOMACH: REMAIN UPRIGHT FOR 30 MINUTES:TAKE WITH 8 OUNCES OF WATER 12 Tab 2     No facility-administered encounter medications on file as of 9/13/2018.      Review of Systems   Constitutional: Negative for chills and fever.   HENT: Negative for sore throat.    Eyes: Negative for blurred vision.   Respiratory: Negative for cough and shortness of breath.    Cardiovascular: Negative for chest pain, palpitations, claudication, leg swelling and PND.   Gastrointestinal: Negative for abdominal pain and nausea.   Musculoskeletal: Negative for falls and joint pain.   Skin: Negative for rash.   Neurological: Negative for dizziness, focal weakness and weakness.   Endo/Heme/Allergies: Does not bruise/bleed easily.        Objective:   /70   Pulse 90   Ht 1.575 m (5' 2\")   Wt 59.9 kg (132 lb)   SpO2 95%   BMI 24.14 kg/m²      Physical Exam   Constitutional: No distress.   HENT:   Mouth/Throat: Oropharynx is clear and " moist. No oropharyngeal exudate.   Eyes: No scleral icterus.   Neck: No JVD present.   Cardiovascular: Normal rate and normal heart sounds.  An irregularly irregular rhythm present. Exam reveals no gallop and no friction rub.    No murmur heard.  Pulmonary/Chest: No respiratory distress. She has no wheezes. She has no rales.   Abdominal: Soft. Bowel sounds are normal.   Musculoskeletal: She exhibits no edema.   Neurological: She is alert.   Skin: No rash noted. She is not diaphoretic.   Psychiatric: She has a normal mood and affect.     We reviewed her prior records including recent vascular follow-up    Assessment:     1. Paroxysmal atrial fibrillation (HCC)  EKG   2. Chronic atrial fibrillation (HCC)     3. Dyslipidemia     4. Anticoagulated     5. Bilateral carotid artery stenosis     6. Stenosis of right subclavian artery (HCC)     7. Former tobacco use     8. Cerebrovascular accident (CVA) due to other mechanism (HCC)         Medical Decision Making:  Today's Assessment / Status / Plan:     It was my pleasure to meet with Ms. Rea.    We clarified her history she is chronic atrial fibrillation rate controlled hypertension well-controlled and peripheral vascular disease mainly right subclavian stenosis without apparent consequence she has mild bilateral carotid disease I had done an MISTI in the past to evaluate for a PFO which was negative    I reinforce importance of statin therapy and asked her to increase this based on her history    I will see Ms. Rea back in 1 year time and encouraged her to follow up with us over the phone or e-mail using my MyChart as issues arise.    It is my pleasure to participate in the care of Ms. Rea.  Please do not hesitate to contact me with questions or concerns.    Patrice Motta MD PhD FAC  Cardiologist [facility]        Chantel Self, P.A.-TUNDE.  [address] [facility] [address]  VIA Facsimile: [phone]

## 2018-09-14 LAB — EKG IMPRESSION: NORMAL

## 2018-09-14 NOTE — PROGRESS NOTES
Chief Complaint   Patient presents with   • HTN (Controlled)       Subjective:   Kaia Rea is a 78 y.o. female who presents today for follow-up of paroxysmal atrial fibrillation mild carotid disease but likely severe stenosis of the right subclavian    She has been doing okay she has had some confusion about her overall medical history she was informed that she had a prior occlusion of her left carotid but based on the reports this is only mildly stenosed she does have a right subclavian stenosis that was seen prior as well she does follow-up regularly with vascular    She is doing okay on chronic anticoagulation    Past Medical History:   Diagnosis Date   • Atrial fibrillation (HCC) 12/11/2014    on Eliquis for proximal A Fib   • Bilateral carotid artery stenosis    • Bleeding tendency (HCC)     due to Eliquis   • Chronic low back pain 8/22/2012   • Dental disorder     removable lower front partial   • Depression 8/22/2012   • History of positive PPD 8/22/2012   • Hyperlipidemia 8/22/2012   • Hypertension     on Amlodipine Lisinopril Metoprolol   • Kidney calculi     x 1   • Left carotid artery occlusion 10/10/2016   • Osteopenia 8/22/2012   • Sleep apnea 08/22/2012    No CPAP since 04/2016  had wt loss of 50 lbs   • Stenosis of right subclavian artery (HCC)    • Stroke ([facility]) 03/2016    mild- had some memory loss   • Vitamin d deficiency 8/22/2012     Past Surgical History:   Procedure Laterality Date   • LIPOMA EXCISION Right 11/21/2017    Procedure: EXCISION LIPOMA RIGHT SHOUDLER/BACK;  Surgeon: Zackary Pacheco M.D.;  Location: SURGERY [facility];  Service: Gen Robotic   • RECOVERY  7/21/2016    Procedure: CATH LAB MISTI WITH ANESTHESIA DR. FOY;  Surgeon: [facility];  Location: SURGERY PRE-POST PROC UNIT [facility];  Service:    • HIP REPLACEMENT, TOTAL Right 02/2016   • CHOLECYSTECTOMY  1991   • APPENDECTOMY  1987   • ABDOMINAL HYSTERECTOMY TOTAL  1962    partial hysterectomy secondary to  prolapse   • CATARACT PHACO WITH IOL Left    • HAMMERTOE CORRECTION Left      Family History   Problem Relation Age of Onset   • Other Mother         unknown reasons   • Non-contributory Father 60        MVA, otherwise healthy   • Diabetes Sister    • Other Sister         obesity   • Cancer Sister         Leukemia   • Cancer Brother         brain tumor     Social History     Social History   • Marital status:      Spouse name: N/A   • Number of children: N/A   • Years of education: N/A     Occupational History   • Retired CNA      Social History Main Topics   • Smoking status: Former Smoker     Packs/day: 0.50     Years: 3.00     Types: Cigarettes     Quit date: 10/20/2014   • Smokeless tobacco: Never Used   • Alcohol use 0.0 oz/week      Comment: rare   • Drug use: No   • Sexual activity: Not on file      Comment:      Other Topics Concern   • Not on file     Social History Narrative     living with .     Allergies   Allergen Reactions   • Ciprofloxacin Palpitations     Nausea and dizziness   • Oxycodone-Acetaminophen Itching   • Seasonal      Outpatient Encounter Prescriptions as of 9/13/2018   Medication Sig Dispense Refill   • simvastatin (ZOCOR) 20 MG Tab Take 1 Tab by mouth every evening. 90 Tab 3   • nitrofurantoin (MACRODANTIN) 50 MG Cap Take 1 Cap by mouth 4 times a day. 20 Cap 0   • metoprolol (LOPRESSOR) 25 MG Tab Take 1 Tab by mouth 2 times a day. For further refills please contact new cardiologist. Thank you 180 Tab 0   • lisinopril (PRINIVIL, ZESTRIL) 40 MG tablet TAKE ONE TABLET BY MOUTH DAILY 90 Tab 0   • ELIQUIS 5 MG Tab TAKE ONE TABLET BY MOUTH TWICE A DAY 60 Tab 5   • traZODone (DESYREL) 50 MG Tab TAKE ONE TABLET BY MOUTH AT BEDTIME AS NEEDED FOR SLEEP 30 Tab 4   • pantoprazole (PROTONIX) 40 MG Tablet Delayed Response TAKE ONE TABLET BY MOUTH DAILY 90 Tab 1   • amLODIPine (NORVASC) 5 MG Tab TAKE ONE TABLET BY MOUTH DAILY 90 Tab 1   • ondansetron (ZOFRAN ODT) 4 MG TABLET  "DISPERSIBLE Take 1-2 Tabs by mouth every 8 hours as needed. 12 Tab 1   • Probiotic Product (PROBIOTIC DAILY PO) Take  by mouth.     • B Complex Vitamins (B COMPLEX PO) Take  by mouth.     • Cholecalciferol (D3 ADULT PO) Take  by mouth.     • Omega-3 1400 MG Cap Take  by mouth.     • Multiple Vitamins-Minerals (MULTI ADULT GUMMIES PO) Take 1 Tab by mouth.     • fluconazole (DIFLUCAN) 100 MG Tab Take 1 Tab by mouth every day. 5 Tab 0   • fluconazole (DIFLUCAN) 150 MG tablet Take 1 Tab by mouth every day. 1 Tab 0   • [DISCONTINUED] simvastatin (ZOCOR) 5 MG Tab TAKE ONE TABLET BY MOUTH EVERY EVENING 90 Tab 2   • alendronate (FOSAMAX) 70 MG Tab TAKE 1 TABLET BY MOUTH ONCE EVERY 7 DAYS BEFORE BREAKFAST, ON AN EMPTY STOMACH: REMAIN UPRIGHT FOR 30 MINUTES:TAKE WITH 8 OUNCES OF WATER 12 Tab 2     No facility-administered encounter medications on file as of 9/13/2018.      Review of Systems   Constitutional: Negative for chills and fever.   HENT: Negative for sore throat.    Eyes: Negative for blurred vision.   Respiratory: Negative for cough and shortness of breath.    Cardiovascular: Negative for chest pain, palpitations, claudication, leg swelling and PND.   Gastrointestinal: Negative for abdominal pain and nausea.   Musculoskeletal: Negative for falls and joint pain.   Skin: Negative for rash.   Neurological: Negative for dizziness, focal weakness and weakness.   Endo/Heme/Allergies: Does not bruise/bleed easily.        Objective:   /70   Pulse 90   Ht 1.575 m (5' 2\")   Wt 59.9 kg (132 lb)   SpO2 95%   BMI 24.14 kg/m²     Physical Exam   Constitutional: No distress.   HENT:   Mouth/Throat: Oropharynx is clear and moist. No oropharyngeal exudate.   Eyes: No scleral icterus.   Neck: No JVD present.   Cardiovascular: Normal rate and normal heart sounds.  An irregularly irregular rhythm present. Exam reveals no gallop and no friction rub.    No murmur heard.  Pulmonary/Chest: No respiratory distress. She has no " wheezes. She has no rales.   Abdominal: Soft. Bowel sounds are normal.   Musculoskeletal: She exhibits no edema.   Neurological: She is alert.   Skin: No rash noted. She is not diaphoretic.   Psychiatric: She has a normal mood and affect.     We reviewed her prior records including recent vascular follow-up    Assessment:     1. Paroxysmal atrial fibrillation (HCC)  EKG   2. Chronic atrial fibrillation (HCC)     3. Dyslipidemia     4. Anticoagulated     5. Bilateral carotid artery stenosis     6. Stenosis of right subclavian artery (HCC)     7. Former tobacco use     8. Cerebrovascular accident (CVA) due to other mechanism (HCC)         Medical Decision Making:  Today's Assessment / Status / Plan:     It was my pleasure to meet with Ms. Rea.    We clarified her history she is chronic atrial fibrillation rate controlled hypertension well-controlled and peripheral vascular disease mainly right subclavian stenosis without apparent consequence she has mild bilateral carotid disease I had done an MISTI in the past to evaluate for a PFO which was negative    I reinforce importance of statin therapy and asked her to increase this based on her history    I will see Ms. Rea back in 1 year time and encouraged her to follow up with us over the phone or e-mail using my MyChart as issues arise.    It is my pleasure to participate in the care of Ms. Rea.  Please do not hesitate to contact me with questions or concerns.    Patrice Motta MD PhD [facility]  Cardiologist [facility]

## 2019-03-25 DIAGNOSIS — I48.0 PAROXYSMAL ATRIAL FIBRILLATION (HCC): ICD-10-CM

## 2019-09-19 ENCOUNTER — OFFICE VISIT (OUTPATIENT)
Dept: CARDIOLOGY | Facility: CLINIC | Age: 79
End: 2019-09-19
Payer: COMMERCIAL

## 2019-09-19 VITALS
HEIGHT: 62 IN | BODY MASS INDEX: 24.48 KG/M2 | WEIGHT: 133 LBS | HEART RATE: 80 BPM | OXYGEN SATURATION: 94 % | SYSTOLIC BLOOD PRESSURE: 140 MMHG | DIASTOLIC BLOOD PRESSURE: 80 MMHG

## 2019-09-19 DIAGNOSIS — E78.5 DYSLIPIDEMIA: ICD-10-CM

## 2019-09-19 DIAGNOSIS — Z79.01 ANTICOAGULATED: ICD-10-CM

## 2019-09-19 DIAGNOSIS — R00.2 PALPITATIONS: ICD-10-CM

## 2019-09-19 DIAGNOSIS — I48.20 CHRONIC ATRIAL FIBRILLATION (HCC): ICD-10-CM

## 2019-09-19 DIAGNOSIS — I65.23 BILATERAL CAROTID ARTERY STENOSIS: Chronic | ICD-10-CM

## 2019-09-19 DIAGNOSIS — I77.1 STENOSIS OF RIGHT SUBCLAVIAN ARTERY (HCC): Chronic | ICD-10-CM

## 2019-09-19 DIAGNOSIS — I10 BENIGN ESSENTIAL HTN: ICD-10-CM

## 2019-09-19 PROCEDURE — 99214 OFFICE O/P EST MOD 30 MIN: CPT | Performed by: INTERNAL MEDICINE

## 2019-09-19 ASSESSMENT — ENCOUNTER SYMPTOMS
ABDOMINAL PAIN: 0
FALLS: 0
NAUSEA: 0
CLAUDICATION: 0
FEVER: 0
WEAKNESS: 0
PND: 0
FOCAL WEAKNESS: 0
SORE THROAT: 0
SHORTNESS OF BREATH: 0
PALPITATIONS: 0
CHILLS: 0
BRUISES/BLEEDS EASILY: 0
DIZZINESS: 0
BLURRED VISION: 0
COUGH: 0

## 2019-09-19 NOTE — LETTER
[facility] [place] [address],   [address]nd Floor  [place] [address]  Phone: [phone]  Fax: [phone]              Kaia Rea  1940    Encounter Date: 9/19/2019    Patrice Foy M.D.          PROGRESS NOTE:  Chief Complaint   Patient presents with   • HTN (Controlled)       Subjective:   Kaia Rea is a 79 y.o. female who presents today for follow-up of her history of atrial fibrillation and stroke vascular disease    She is doing over the last year she does follow-up annually with vascular surgery as well    No new concerns with palpitations blood pressure will bit high today but typically really well controlled with primary care visits weight is been stable    Past Medical History:   Diagnosis Date   • Atrial fibrillation (HCC) 12/11/2014    on Eliquis for proximal A Fib   • Bilateral carotid artery stenosis    • Bleeding tendency (HCC)     due to Eliquis   • Chronic low back pain 8/22/2012   • Dental disorder     removable lower front partial   • Depression 8/22/2012   • History of positive PPD 8/22/2012   • Hyperlipidemia 8/22/2012   • Hypertension     on Amlodipine Lisinopril Metoprolol   • Kidney calculi     x 1   • Osteopenia 8/22/2012   • Sleep apnea 08/22/2012    No CPAP since 04/2016  had wt loss of 50 lbs   • Stenosis of right subclavian artery (HCC)    • Stroke (HCC) 03/2016    mild- had some memory loss   • Vitamin d deficiency 8/22/2012     Past Surgical History:   Procedure Laterality Date   • LIPOMA EXCISION Right 11/21/2017    Procedure: EXCISION LIPOMA RIGHT SHOUDLER/BACK;  Surgeon: Zackary Pacheco M.D.;  Location: SURGERY [facility];  Service: Gen Robotic   • RECOVERY  7/21/2016    Procedure: CATH LAB MISTI WITH ANESTHESIA DR. FOY;  Surgeon: [facility];  Location: SURGERY PRE-POST PROC UNIT [facility] [place];  Service:    • HIP REPLACEMENT, TOTAL Right 02/2016   • CHOLECYSTECTOMY  1991   •  APPENDECTOMY     • ABDOMINAL HYSTERECTOMY TOTAL      partial hysterectomy secondary to prolapse   • CATARACT PHACO WITH IOL Left    • HAMMERTOE CORRECTION Left      Family History   Problem Relation Age of Onset   • Other Mother         unknown reasons   • Non-contributory Father 60        MVA, otherwise healthy   • Diabetes Sister    • Other Sister         obesity   • Cancer Sister         Leukemia   • Cancer Brother         brain tumor     Social History     Socioeconomic History   • Marital status:      Spouse name: Not on file   • Number of children: Not on file   • Years of education: Not on file   • Highest education level: Not on file   Occupational History   • Occupation: Retired CNA   Social Needs   • Financial resource strain: Not on file   • Food insecurity:     Worry: Not on file     Inability: Not on file   • Transportation needs:     Medical: Not on file     Non-medical: Not on file   Tobacco Use   • Smoking status: Former Smoker     Packs/day: 0.50     Years: 3.00     Pack years: 1.50     Types: Cigarettes     Last attempt to quit: 10/20/2014     Years since quittin.9   • Smokeless tobacco: Never Used   Substance and Sexual Activity   • Alcohol use: Yes     Alcohol/week: 0.0 oz     Comment: rare   • Drug use: No   • Sexual activity: Not on file     Comment:    Lifestyle   • Physical activity:     Days per week: Not on file     Minutes per session: Not on file   • Stress: Not on file   Relationships   • Social connections:     Talks on phone: Not on file     Gets together: Not on file     Attends Yazdanism service: Not on file     Active member of club or organization: Not on file     Attends meetings of clubs or organizations: Not on file     Relationship status: Not on file   • Intimate partner violence:     Fear of current or ex partner: Not on file     Emotionally abused: Not on file     Physically abused: Not on file     Forced sexual activity: Not on file   Other Topics  Concern   • Not on file   Social History Narrative     living with .     Allergies   Allergen Reactions   • Ciprofloxacin Palpitations     Nausea and dizziness   • Oxycodone-Acetaminophen Itching   • Seasonal      Outpatient Encounter Medications as of 9/19/2019   Medication Sig Dispense Refill   • pantoprazole (PROTONIX) 40 MG Tablet Delayed Response TAKE ONE TABLET BY MOUTH DAILY 90 Tab 1   • apixaban (ELIQUIS) 5mg Tab Take 1 Tab by mouth 2 Times a Day. 180 Tab 1   • alendronate (FOSAMAX) 70 MG Tab TAKE 1 TABLET BY MOUTH ONCE EVERY 7 DAYS BEFORE BREAKFAST, ON AN EMPTY STOMACH: REMAIN UPRIGHT FOR 30 MINUTES:TAKE WITH 8 OUNCES OF WATER 7 Tab 0   • amLODIPine (NORVASC) 5 MG Tab TAKE ONE TABLET BY MOUTH DAILY 90 Tab 0   • lisinopril (PRINIVIL, ZESTRIL) 40 MG tablet TAKE ONE TABLET BY MOUTH DAILY 90 Tab 1   • metoprolol (LOPRESSOR) 25 MG Tab Take 1 Tab by mouth 2 times a day. 180 Tab 2   • simvastatin (ZOCOR) 20 MG Tab Take 1 Tab by mouth every evening. 90 Tab 3   • Probiotic Product (PROBIOTIC DAILY PO) Take  by mouth.     • B Complex Vitamins (B COMPLEX PO) Take  by mouth.     • Cholecalciferol (D3 ADULT PO) Take  by mouth.     • Omega-3 1400 MG Cap Take  by mouth.     • Multiple Vitamins-Minerals (MULTI ADULT GUMMIES PO) Take 1 Tab by mouth.     • [DISCONTINUED] amLODIPine (NORVASC) 5 MG Tab TAKE ONE TABLET BY MOUTH DAILY 30 Tab 0   • [DISCONTINUED] simvastatin (ZOCOR) 5 MG Tab TAKE ONE TABLET BY MOUTH EVERY EVENING 100 Tab 1   • [DISCONTINUED] fluconazole (DIFLUCAN) 150 MG tablet Take 1 Tab by mouth every day. Repeat in 2 days if needed 4 Tab 0   • [DISCONTINUED] fluconazole (DIFLUCAN) 100 MG Tab Take 1 Tab by mouth every day. 5 Tab 0   • traZODone (DESYREL) 50 MG Tab TAKE ONE TABLET BY MOUTH AT BEDTIME AS NEEDED FOR SLEEP 30 Tab 4   • [DISCONTINUED] ondansetron (ZOFRAN ODT) 4 MG TABLET DISPERSIBLE Take 1-2 Tabs by mouth every 8 hours as needed. 12 Tab 1     No facility-administered encounter  "medications on file as of 9/19/2019.      Review of Systems   Constitutional: Negative for chills and fever.   HENT: Negative for sore throat.    Eyes: Negative for blurred vision.   Respiratory: Negative for cough and shortness of breath.    Cardiovascular: Negative for chest pain, palpitations, claudication, leg swelling and PND.   Gastrointestinal: Negative for abdominal pain and nausea.   Musculoskeletal: Negative for falls and joint pain.   Skin: Negative for rash.   Neurological: Negative for dizziness, focal weakness and weakness.   Endo/Heme/Allergies: Does not bruise/bleed easily.        Objective:   /80 (BP Location: Right arm, Patient Position: Sitting)   Pulse 80   Ht 1.575 m (5' 2\")   Wt 60.3 kg (133 lb)   SpO2 94%   BMI 24.33 kg/m²      Physical Exam   Constitutional: No distress.   HENT:   Mouth/Throat: Oropharynx is clear and moist. No oropharyngeal exudate.   Eyes: No scleral icterus.   Neck: No JVD present.   Cardiovascular: Normal rate and normal heart sounds. Exam reveals no gallop and no friction rub.   No murmur heard.  Pulmonary/Chest: No respiratory distress. She has no wheezes. She has no rales.   Abdominal: Soft. Bowel sounds are normal.   Musculoskeletal: She exhibits no edema.   Neurological: She is alert.   Skin: No rash noted. She is not diaphoretic.   Psychiatric: She has a normal mood and affect.         We reviewed in person the most recent labs  Recent Results (from the past 4032 hour(s))   Comp Metabolic Panel    Collection Time: 06/28/19 10:15 AM   Result Value Ref Range    Sodium 142 136 - 145 mmol/L    Potassium 4.2 3.5 - 5.1 mmol/L    Chloride 105 98 - 107 mmol/L    Co2 28 21 - 32 mmol/L    Anion Gap 13 10 - 18 mmol/L    Glucose 96 74 - 99 mg/dL    Bun 22 (H) 7 - 18 mg/dL    Creatinine 1.0 0.6 - 1.0 mg/dL    Calcium 8.4 (L) 8.5 - 11.0 mg/dL    AST(SGOT) 23 15 - 37 U/L    ALT(SGPT) 20 12 - 78 U/L    Alkaline Phosphatase 49 46 - 116 U/L    Total Bilirubin 1.1 (H) 0.2 - " 1.0 mg/dL    Albumin 3.4 3.4 - 5.0 g/dL    Total Protein 6.8 6.4 - 8.2 g/dL    A-G Ratio 1.0    CBC WITHOUT DIFFERENTIAL    Collection Time: 06/28/19 10:15 AM   Result Value Ref Range    WBC 5.7 4.8 - 10.8 K/uL    RBC 4.84 4.20 - 5.40 M/uL    Hemoglobin 14.7 13.0 - 17.0 g/dL    Hematocrit 44.3 39.0 - 50.0 %    MCV 91.5 81.0 - 99.0 fL    MCH 30.4 27.0 - 31.0 pg    MCHC 33.2 33.0 - 37.0 g/dL    RDW 12.5 11.5 - 14.5 %    Platelet Count 229 130 - 400 K/uL    MPV 10.5 (H) 7.4 - 10.4 fL   Lipid Profile    Collection Time: 06/28/19 10:15 AM   Result Value Ref Range    Cholesterol,Tot 107 (L) 120 - 200 mg/dL    Triglycerides 92 0 - 150 mg/dL    LDL 55 <100 mg/dL    HDL 34.0 (L) 40.0 - 60.0 mg/dL    Chol-Hdl Ratio 3.15     Non HDL Cholesterol 73 30 - 160   ESTIMATED GFR    Collection Time: 06/28/19 10:15 AM   Result Value Ref Range    GFR If African American >60 >60 mL/min/1.73 m 2    GFR If Non African American 54 (A) >60 mL/min/1.73 m 2       Assessment:     1. Bilateral carotid artery stenosis     2. Benign essential HTN     3. Anticoagulated     4. Palpitations     5. Chronic atrial fibrillation (HCC)     6. Dyslipidemia     7. Stenosis of right subclavian artery (HCC)         Medical Decision Making:  Today's Assessment / Status / Plan:     It was my pleasure to meet with Ms. Rea.    Blood pressure is well controlled.      She is on appropriate statin.    Sees Dr Murray for vascular    I will see Ms. Rea back in 1 year time and encouraged her to follow up with us over the phone or electronically using my MyChart as issues arise.    It is my pleasure to participate in the care of Ms. Rea.  Please do not hesitate to contact me with questions or concerns.    Patrice Motta MD PhD formerly [facility]  Cardiologist [facility]    Please note that this dictation was created using voice recognition software. I have worked with consultants from the vendor as well as technical experts from  [facility] to optimize the interface. I have made every reasonable attempt to correct obvious errors, but I expect that there are errors of grammar and possibly content I did not discover before finalizing the note.         Sea Murray M.D.  [address] [facility] [address]  VIA Facsimile: [phone]

## 2019-09-19 NOTE — PROGRESS NOTES
Chief Complaint   Patient presents with   • HTN (Controlled)       Subjective:   Kaia Rea is a 79 y.o. female who presents today for follow-up of her history of atrial fibrillation and stroke vascular disease    She is doing over the last year she does follow-up annually with vascular surgery as well    No new concerns with palpitations blood pressure will bit high today but typically really well controlled with primary care visits weight is been stable    Past Medical History:   Diagnosis Date   • Atrial fibrillation (HCC) 12/11/2014    on Eliquis for proximal A Fib   • Bilateral carotid artery stenosis    • Bleeding tendency (HCC)     due to Eliquis   • Chronic low back pain 8/22/2012   • Dental disorder     removable lower front partial   • Depression 8/22/2012   • History of positive PPD 8/22/2012   • Hyperlipidemia 8/22/2012   • Hypertension     on Amlodipine Lisinopril Metoprolol   • Kidney calculi     x 1   • Osteopenia 8/22/2012   • Sleep apnea 08/22/2012    No CPAP since 04/2016  had wt loss of 50 lbs   • Stenosis of right subclavian artery (HCC)    • Stroke ([facility]) 03/2016    mild- had some memory loss   • Vitamin d deficiency 8/22/2012     Past Surgical History:   Procedure Laterality Date   • LIPOMA EXCISION Right 11/21/2017    Procedure: EXCISION LIPOMA RIGHT SHOUDLER/BACK;  Surgeon: Zackary Pacheco M.D.;  Location: SURGERY [facility];  Service: Gen Robotic   • RECOVERY  7/21/2016    Procedure: CATH LAB MISTI WITH ANESTHESIA DR. FOY;  Surgeon: [facility];  Location[place] [facility] [place];  Service:    • HIP REPLACEMENT, TOTAL Right 02/2016   • CHOLECYSTECTOMY  1991   • APPENDECTOMY  1987   • ABDOMINAL HYSTERECTOMY TOTAL  1962    partial hysterectomy secondary to prolapse   • CATARACT PHACO WITH IOL Left    • HAMMERTOE CORRECTION Left      Family History   Problem Relation Age of Onset   • Other Mother         unknown reasons   • Non-contributory Father 60        MVA,  otherwise healthy   • Diabetes Sister    • Other Sister         obesity   • Cancer Sister         Leukemia   • Cancer Brother         brain tumor     Social History     Socioeconomic History   • Marital status:      Spouse name: Not on file   • Number of children: Not on file   • Years of education: Not on file   • Highest education level: Not on file   Occupational History   • Occupation: Retired CNA   Social Needs   • Financial resource strain: Not on file   • Food insecurity:     Worry: Not on file     Inability: Not on file   • Transportation needs:     Medical: Not on file     Non-medical: Not on file   Tobacco Use   • Smoking status: Former Smoker     Packs/day: 0.50     Years: 3.00     Pack years: 1.50     Types: Cigarettes     Last attempt to quit: 10/20/2014     Years since quittin.9   • Smokeless tobacco: Never Used   Substance and Sexual Activity   • Alcohol use: Yes     Alcohol/week: 0.0 oz     Comment: rare   • Drug use: No   • Sexual activity: Not on file     Comment:    Lifestyle   • Physical activity:     Days per week: Not on file     Minutes per session: Not on file   • Stress: Not on file   Relationships   • Social connections:     Talks on phone: Not on file     Gets together: Not on file     Attends Christianity service: Not on file     Active member of club or organization: Not on file     Attends meetings of clubs or organizations: Not on file     Relationship status: Not on file   • Intimate partner violence:     Fear of current or ex partner: Not on file     Emotionally abused: Not on file     Physically abused: Not on file     Forced sexual activity: Not on file   Other Topics Concern   • Not on file   Social History Narrative     living with .     Allergies   Allergen Reactions   • Ciprofloxacin Palpitations     Nausea and dizziness   • Oxycodone-Acetaminophen Itching   • Seasonal      Outpatient Encounter Medications as of 2019   Medication Sig Dispense  Refill   • pantoprazole (PROTONIX) 40 MG Tablet Delayed Response TAKE ONE TABLET BY MOUTH DAILY 90 Tab 1   • apixaban (ELIQUIS) 5mg Tab Take 1 Tab by mouth 2 Times a Day. 180 Tab 1   • alendronate (FOSAMAX) 70 MG Tab TAKE 1 TABLET BY MOUTH ONCE EVERY 7 DAYS BEFORE BREAKFAST, ON AN EMPTY STOMACH: REMAIN UPRIGHT FOR 30 MINUTES:TAKE WITH 8 OUNCES OF WATER 7 Tab 0   • amLODIPine (NORVASC) 5 MG Tab TAKE ONE TABLET BY MOUTH DAILY 90 Tab 0   • lisinopril (PRINIVIL, ZESTRIL) 40 MG tablet TAKE ONE TABLET BY MOUTH DAILY 90 Tab 1   • metoprolol (LOPRESSOR) 25 MG Tab Take 1 Tab by mouth 2 times a day. 180 Tab 2   • simvastatin (ZOCOR) 20 MG Tab Take 1 Tab by mouth every evening. 90 Tab 3   • Probiotic Product (PROBIOTIC DAILY PO) Take  by mouth.     • B Complex Vitamins (B COMPLEX PO) Take  by mouth.     • Cholecalciferol (D3 ADULT PO) Take  by mouth.     • Omega-3 1400 MG Cap Take  by mouth.     • Multiple Vitamins-Minerals (MULTI ADULT GUMMIES PO) Take 1 Tab by mouth.     • [DISCONTINUED] amLODIPine (NORVASC) 5 MG Tab TAKE ONE TABLET BY MOUTH DAILY 30 Tab 0   • [DISCONTINUED] simvastatin (ZOCOR) 5 MG Tab TAKE ONE TABLET BY MOUTH EVERY EVENING 100 Tab 1   • [DISCONTINUED] fluconazole (DIFLUCAN) 150 MG tablet Take 1 Tab by mouth every day. Repeat in 2 days if needed 4 Tab 0   • [DISCONTINUED] fluconazole (DIFLUCAN) 100 MG Tab Take 1 Tab by mouth every day. 5 Tab 0   • traZODone (DESYREL) 50 MG Tab TAKE ONE TABLET BY MOUTH AT BEDTIME AS NEEDED FOR SLEEP 30 Tab 4   • [DISCONTINUED] ondansetron (ZOFRAN ODT) 4 MG TABLET DISPERSIBLE Take 1-2 Tabs by mouth every 8 hours as needed. 12 Tab 1     No facility-administered encounter medications on file as of 9/19/2019.      Review of Systems   Constitutional: Negative for chills and fever.   HENT: Negative for sore throat.    Eyes: Negative for blurred vision.   Respiratory: Negative for cough and shortness of breath.    Cardiovascular: Negative for chest pain, palpitations, claudication,  "leg swelling and PND.   Gastrointestinal: Negative for abdominal pain and nausea.   Musculoskeletal: Negative for falls and joint pain.   Skin: Negative for rash.   Neurological: Negative for dizziness, focal weakness and weakness.   Endo/Heme/Allergies: Does not bruise/bleed easily.        Objective:   /80 (BP Location: Right arm, Patient Position: Sitting)   Pulse 80   Ht 1.575 m (5' 2\")   Wt 60.3 kg (133 lb)   SpO2 94%   BMI 24.33 kg/m²     Physical Exam   Constitutional: No distress.   HENT:   Mouth/Throat: Oropharynx is clear and moist. No oropharyngeal exudate.   Eyes: No scleral icterus.   Neck: No JVD present.   Cardiovascular: Normal rate and normal heart sounds. Exam reveals no gallop and no friction rub.   No murmur heard.  Pulmonary/Chest: No respiratory distress. She has no wheezes. She has no rales.   Abdominal: Soft. Bowel sounds are normal.   Musculoskeletal: She exhibits no edema.   Neurological: She is alert.   Skin: No rash noted. She is not diaphoretic.   Psychiatric: She has a normal mood and affect.         We reviewed in person the most recent labs  Recent Results (from the past 4032 hour(s))   Comp Metabolic Panel    Collection Time: 06/28/19 10:15 AM   Result Value Ref Range    Sodium 142 136 - 145 mmol/L    Potassium 4.2 3.5 - 5.1 mmol/L    Chloride 105 98 - 107 mmol/L    Co2 28 21 - 32 mmol/L    Anion Gap 13 10 - 18 mmol/L    Glucose 96 74 - 99 mg/dL    Bun 22 (H) 7 - 18 mg/dL    Creatinine 1.0 0.6 - 1.0 mg/dL    Calcium 8.4 (L) 8.5 - 11.0 mg/dL    AST(SGOT) 23 15 - 37 U/L    ALT(SGPT) 20 12 - 78 U/L    Alkaline Phosphatase 49 46 - 116 U/L    Total Bilirubin 1.1 (H) 0.2 - 1.0 mg/dL    Albumin 3.4 3.4 - 5.0 g/dL    Total Protein 6.8 6.4 - 8.2 g/dL    A-G Ratio 1.0    CBC WITHOUT DIFFERENTIAL    Collection Time: 06/28/19 10:15 AM   Result Value Ref Range    WBC 5.7 4.8 - 10.8 K/uL    RBC 4.84 4.20 - 5.40 M/uL    Hemoglobin 14.7 13.0 - 17.0 g/dL    Hematocrit 44.3 39.0 - 50.0 %    " MCV 91.5 81.0 - 99.0 fL    MCH 30.4 27.0 - 31.0 pg    MCHC 33.2 33.0 - 37.0 g/dL    RDW 12.5 11.5 - 14.5 %    Platelet Count 229 130 - 400 K/uL    MPV 10.5 (H) 7.4 - 10.4 fL   Lipid Profile    Collection Time: 06/28/19 10:15 AM   Result Value Ref Range    Cholesterol,Tot 107 (L) 120 - 200 mg/dL    Triglycerides 92 0 - 150 mg/dL    LDL 55 <100 mg/dL    HDL 34.0 (L) 40.0 - 60.0 mg/dL    Chol-Hdl Ratio 3.15     Non HDL Cholesterol 73 30 - 160   ESTIMATED GFR    Collection Time: 06/28/19 10:15 AM   Result Value Ref Range    GFR If African American >60 >60 mL/min/1.73 m 2    GFR If Non African American 54 (A) >60 mL/min/1.73 m 2       Assessment:     1. Bilateral carotid artery stenosis     2. Benign essential HTN     3. Anticoagulated     4. Palpitations     5. Chronic atrial fibrillation (HCC)     6. Dyslipidemia     7. Stenosis of right subclavian artery (HCC)         Medical Decision Making:  Today's Assessment / Status / Plan:     It was my pleasure to meet with Ms. Rea.    Blood pressure is well controlled.      She is on appropriate statin.    Sees Dr Murray for vascular    I will see Ms. Rea back in 1 year time and encouraged her to follow up with us over the phone or electronically using my MyChart as issues arise.    It is my pleasure to participate in the care of Ms. Rea.  Please do not hesitate to contact me with questions or concerns.    Patrice Motta MD PhD FACC  Cardiologist [facility] for [facility] and [facility]    Please note that this dictation was created using voice recognition software. I have worked with consultants from the vendor as well as technical experts from [facility] to optimize the interface. I have made every reasonable attempt to correct obvious errors, but I expect that there are errors of grammar and possibly content I did not discover before finalizing the note.

## 2019-11-18 DIAGNOSIS — E78.5 DYSLIPIDEMIA: Primary | ICD-10-CM

## 2019-11-18 RX ORDER — SIMVASTATIN 20 MG
TABLET ORAL
Qty: 90 TAB | Refills: 3 | Status: SHIPPED | OUTPATIENT
Start: 2019-11-18 | End: 2020-11-13

## 2020-02-13 DIAGNOSIS — I48.0 PAROXYSMAL ATRIAL FIBRILLATION (HCC): ICD-10-CM

## 2020-08-18 ENCOUNTER — OFFICE VISIT (OUTPATIENT)
Dept: CARDIOLOGY | Facility: CLINIC | Age: 80
End: 2020-08-18
Payer: COMMERCIAL

## 2020-08-18 VITALS
HEIGHT: 62 IN | HEART RATE: 100 BPM | BODY MASS INDEX: 25.4 KG/M2 | SYSTOLIC BLOOD PRESSURE: 140 MMHG | OXYGEN SATURATION: 93 % | WEIGHT: 138 LBS | DIASTOLIC BLOOD PRESSURE: 80 MMHG

## 2020-08-18 DIAGNOSIS — I65.23 BILATERAL CAROTID ARTERY STENOSIS: Chronic | ICD-10-CM

## 2020-08-18 DIAGNOSIS — I48.0 PAROXYSMAL ATRIAL FIBRILLATION (HCC): ICD-10-CM

## 2020-08-18 DIAGNOSIS — R06.09 DYSPNEA ON EXERTION: ICD-10-CM

## 2020-08-18 DIAGNOSIS — I10 BENIGN ESSENTIAL HTN: ICD-10-CM

## 2020-08-18 DIAGNOSIS — I48.20 CHRONIC ATRIAL FIBRILLATION (HCC): ICD-10-CM

## 2020-08-18 DIAGNOSIS — I77.1 STENOSIS OF RIGHT SUBCLAVIAN ARTERY (HCC): Chronic | ICD-10-CM

## 2020-08-18 DIAGNOSIS — Z79.01 ANTICOAGULATED: ICD-10-CM

## 2020-08-18 DIAGNOSIS — I63.89 CEREBROVASCULAR ACCIDENT (CVA) DUE TO OTHER MECHANISM (HCC): ICD-10-CM

## 2020-08-18 LAB — EKG IMPRESSION: NORMAL

## 2020-08-18 PROCEDURE — 99214 OFFICE O/P EST MOD 30 MIN: CPT | Performed by: INTERNAL MEDICINE

## 2020-08-18 PROCEDURE — 93000 ELECTROCARDIOGRAM COMPLETE: CPT | Performed by: INTERNAL MEDICINE

## 2020-08-18 ASSESSMENT — ENCOUNTER SYMPTOMS
CHILLS: 0
CLAUDICATION: 0
PND: 0
FEVER: 0
PALPITATIONS: 0
SHORTNESS OF BREATH: 1
FOCAL WEAKNESS: 0
DIZZINESS: 0
BLURRED VISION: 0
COUGH: 0
NAUSEA: 0
BRUISES/BLEEDS EASILY: 0
ABDOMINAL PAIN: 0
WEAKNESS: 0
FALLS: 0
SORE THROAT: 0

## 2020-08-18 ASSESSMENT — FIBROSIS 4 INDEX: FIB4 SCORE: 1.8

## 2020-08-18 NOTE — PROGRESS NOTES
Chief Complaint   Patient presents with   • Atrial Fibrillation       Subjective:   Kaia Rea is a 80 y.o. female who presents today for follow-up with her history of chronic atrial fibrillation and stroke vascular disease on chronic anticoagulation    She recently turned 80 and was in [facility] with family she has had significant shortness of breath at rest is especially with exertion over the past week he was concerned this was due to her heart    Past Medical History:   Diagnosis Date   • Atrial fibrillation (HCC) 12/11/2014    on Eliquis for proximal A Fib   • Bilateral carotid artery stenosis    • Bleeding tendency (HCC)     due to Eliquis   • Chronic low back pain 8/22/2012   • Dental disorder     removable lower front partial   • Depression 8/22/2012   • History of positive PPD 8/22/2012   • Hyperlipidemia 8/22/2012   • Hypertension     on Amlodipine Lisinopril Metoprolol   • Kidney calculi     x 1   • Osteopenia 8/22/2012   • Sleep apnea 08/22/2012    No CPAP since 04/2016  had wt loss of 50 lbs   • Stenosis of right subclavian artery (HCC)    • Stroke ([facility]) 03/2016    mild- had some memory loss   • Vitamin d deficiency 8/22/2012     Past Surgical History:   Procedure Laterality Date   • LIPOMA EXCISION Right 11/21/2017    Procedure: EXCISION LIPOMA RIGHT SHOUDLER/BACK;  Surgeon: Zackary Pacheco M.D.;  Location: SURGERY [facility];  Service: Gen Robotic   • RECOVERY  7/21/2016    Procedure: CATH LAB MISTI WITH ANESTHESIA DR. FOY;  Surgeon: [facility];  Location: SURGERY PRE-POST PROC UNIT [facility];  Service:    • HIP REPLACEMENT, TOTAL Right 02/2016   • CHOLECYSTECTOMY  1991   • APPENDECTOMY  1987   • ABDOMINAL HYSTERECTOMY TOTAL  1962    partial hysterectomy secondary to prolapse   • CATARACT PHACO WITH IOL Left    • HAMMERTOE CORRECTION Left      Family History   Problem Relation Age of Onset   • Other Mother         unknown reasons   • Non-contributory Father 60        MVA,  otherwise healthy   • Diabetes Sister    • Other Sister         obesity   • Cancer Sister         Leukemia   • Cancer Brother         brain tumor     Social History     Socioeconomic History   • Marital status:      Spouse name: Not on file   • Number of children: Not on file   • Years of education: Not on file   • Highest education level: Not on file   Occupational History   • Occupation: Retired CNA   Social Needs   • Financial resource strain: Not on file   • Food insecurity     Worry: Not on file     Inability: Not on file   • Transportation needs     Medical: Not on file     Non-medical: Not on file   Tobacco Use   • Smoking status: Former Smoker     Packs/day: 0.50     Years: 3.00     Pack years: 1.50     Types: Cigarettes     Quit date: 10/20/2014     Years since quittin.8   • Smokeless tobacco: Never Used   Substance and Sexual Activity   • Alcohol use: Yes     Alcohol/week: 0.0 oz     Comment: rare   • Drug use: No   • Sexual activity: Not on file     Comment:    Lifestyle   • Physical activity     Days per week: Not on file     Minutes per session: Not on file   • Stress: Not on file   Relationships   • Social connections     Talks on phone: Not on file     Gets together: Not on file     Attends Nondenominational service: Not on file     Active member of club or organization: Not on file     Attends meetings of clubs or organizations: Not on file     Relationship status: Not on file   • Intimate partner violence     Fear of current or ex partner: Not on file     Emotionally abused: Not on file     Physically abused: Not on file     Forced sexual activity: Not on file   Other Topics Concern   • Not on file   Social History Narrative     living with .     Allergies   Allergen Reactions   • Ciprofloxacin Palpitations     Nausea and dizziness   • Oxycodone-Acetaminophen Itching   • Seasonal      Outpatient Encounter Medications as of 2020   Medication Sig Dispense Refill   •  "alendronate (FOSAMAX) 70 MG Tab TAKE 1 TABLET BY MOUTH ONCE EVERY 7 DAYS BEFORE BREAKFAST, ON AN EMPTY STOMACH: REMAIN UPRIGHT FOR 30 MINUTES:TAKE WITH 8 OUNCES OF WATER 12 Tab 0   • ELIQUIS 5 MG Tab TAKE ONE TABLET BY MOUTH TWICE A  Tab 1   • lisinopril (PRINIVIL) 40 MG tablet TAKE ONE TABLET BY MOUTH DAILY 90 Tab 0   • pantoprazole (PROTONIX) 40 MG Tablet Delayed Response TAKE ONE TABLET BY MOUTH DAILY 90 Tab 0   • amLODIPine (NORVASC) 5 MG Tab TAKE ONE TABLET BY MOUTH DAILY 90 Tab 1   • metoprolol (LOPRESSOR) 25 MG Tab TAKE ONE TABLET BY MOUTH TWICE A  Tab 2   • simvastatin (ZOCOR) 20 MG Tab TAKE ONE TABLET BY MOUTH EVERY EVENING 90 Tab 3   • traZODone (DESYREL) 50 MG Tab TAKE ONE TABLET BY MOUTH AT BEDTIME AS NEEDED FOR SLEEP 30 Tab 4   • Probiotic Product (PROBIOTIC DAILY PO) Take  by mouth.     • B Complex Vitamins (B COMPLEX PO) Take  by mouth.     • Cholecalciferol (D3 ADULT PO) Take  by mouth.     • Omega-3 1400 MG Cap Take  by mouth.     • Multiple Vitamins-Minerals (MULTI ADULT GUMMIES PO) Take 1 Tab by mouth.       No facility-administered encounter medications on file as of 8/18/2020.      Review of Systems   Constitutional: Negative for chills and fever.   HENT: Negative for sore throat.    Eyes: Negative for blurred vision.   Respiratory: Positive for shortness of breath. Negative for cough.    Cardiovascular: Negative for chest pain, palpitations, claudication, leg swelling and PND.   Gastrointestinal: Negative for abdominal pain and nausea.   Musculoskeletal: Negative for falls and joint pain.   Skin: Negative for rash.   Neurological: Negative for dizziness, focal weakness and weakness.   Endo/Heme/Allergies: Does not bruise/bleed easily.        Objective:   /80 (BP Location: Right arm, Patient Position: Sitting)   Pulse 100   Ht 1.575 m (5' 2\")   Wt 62.6 kg (138 lb)   SpO2 93%   BMI 25.24 kg/m²     Physical Exam   HENT:   Mouth/Throat: Oropharynx is clear and moist. "   Cardiovascular: Normal rate. An irregularly irregular rhythm present.   Pulmonary/Chest: Effort normal.   Musculoskeletal:         General: No edema.     Complete physical exam was deferred given patient had possible COVID    Assessment:     1. Paroxysmal atrial fibrillation (HCC)  EKG   2. Chronic atrial fibrillation (HCC)     3. Anticoagulated     4. Benign essential HTN     5. Bilateral carotid artery stenosis     6. Stenosis of right subclavian artery (HCC)     7. Cerebrovascular accident (CVA) due to other mechanism (HCC)     8. Dyspnea on exertion  SARS-CoV-2, PCR (In-House)       Medical Decision Making:  Today's Assessment / Status / Plan:     It was my pleasure to meet with Ms. Rea.    Blood pressure is well controlled.  We specifically assessed the labs on hypertension treatment    Will advise her to get coronavirus testing and perform necessary precautions to prevent exposure coronavirus    If she were negative for pruritus and her symptoms persist encouraged her to seek emergency evaluation in the short-term in the long-term if they persist at this degree certainly could explore a stress test    I will see Ms. Rea back in 6 months time and encouraged her to follow up with us over the phone or electronically using my MyChart as issues arise.    It is my pleasure to participate in the care of Ms. Rea.  Please do not hesitate to contact me with questions or concerns.    Patrice Motta MD PhD FAC  Cardiologist [facility]    Please note that this dictation was created using voice recognition software. There may be errors I did not discover before finalizing the note.

## 2020-08-24 ENCOUNTER — TELEPHONE (OUTPATIENT)
Dept: CARDIOLOGY | Facility: MEDICAL CENTER | Age: 80
End: 2020-08-24

## 2020-08-24 NOTE — TELEPHONE ENCOUNTER
Pt called back. Informed her of results. Pt states she is breathing better after lasix, legs are not swollen and she lost about 5 pounds. Informed her CW states she can take lasix PRN to keep weight and breathing good, pt understands.       To CW DEYSI

## 2020-08-24 NOTE — TELEPHONE ENCOUNTER
Patrice Motta M.D.  Aliya Ferraro, RCiciN.; ALEAH Malone.             Good news the coronavirus test was negative, still reasonable to self isolate for antoher week but she should feel better with the lasix Ms Keven gave her and it looks like most likely salt retention was the issue so she needs to take the furosemide as needed to keep her weight and breathing good, if she hasn't responded then we need to consider a stress test.     Aliya please call and let her know     thanks      M asking pt to return our call to discuss above information

## 2020-10-22 PROBLEM — I65.23 OCCLUSION AND STENOSIS OF BILATERAL CAROTID ARTERIES: Status: ACTIVE | Noted: 2020-10-22

## 2020-10-22 PROBLEM — N94.89 ADNEXAL MASS: Status: ACTIVE | Noted: 2020-10-22

## 2020-10-22 PROBLEM — I71.40 ABDOMINAL AORTIC ANEURYSM (AAA) WITHOUT RUPTURE (HCC): Status: ACTIVE | Noted: 2020-10-22

## 2020-11-12 DIAGNOSIS — E78.5 DYSLIPIDEMIA: ICD-10-CM

## 2020-11-12 DIAGNOSIS — I48.0 PAROXYSMAL ATRIAL FIBRILLATION (HCC): ICD-10-CM

## 2020-11-13 RX ORDER — SIMVASTATIN 20 MG
TABLET ORAL
Qty: 90 TAB | Refills: 2 | Status: SHIPPED | OUTPATIENT
Start: 2020-11-13 | End: 2022-03-15

## 2021-05-17 NOTE — PROGRESS NOTES
Subjective:   Chief Complaint:   Chief Complaint   Patient presents with   • Atrial Fibrillation       Kaia Rea is a 80 y.o. female who returns for chronic afib, anticoagulation.  Today she is here urgently for syncope and lightheadedness.    Sees Dr. Motta, last seen 8-.    Feels a sense of heat coming from legs and climbs up like a hot flash, feels flushed, severe lightheadedness. No nausea.  Has time to sit down most of the time.  Was standing in front of a couch at a neighbors house, fell back on the couch.  Can get dizzy rolling over in bed which sounds like vertigo but it is brief and very different from her lightheadedness.  Nothing associated with use of right upper extremity.    Had her ovaries removed December 2020.    Used to be on furosemide but no longer taking.    Has HTN, 149/60 at home.  Sometimes the bottom number in the 80s.    Has chronic afib.  Prior stroke.  [id number] of 6 with prior stroke.  Only taking Apixaban 5 mg once daily.  Used to be on metoprolol, no longer on her medication list, not sure why.    Also, has some carotid plaque., seeing Dr. Sea Murray, was told she does not need to come back for 5 years.  Also told stenosis of the right subclavian artery but I do not hear a murmur.    CKD 3a.    Last LDL cholesterol in 2019 was 55, HDL low at 34.    She is not limited by chest pain, pressure or tightness with activity.   No significant dyspnea on exertion, orthopnea or lower extremity swelling.   No significant palpitations.  No symptoms of leg claudication.   No stroke/TIA like symptoms.      DATA REVIEWED by me:  ECG (my personal interpretation) 5-18-21  Afib, 103, PVC, low voltage throughout, baseline wandering    Echo 2016  EF 50 to 55%, possible PFO      Most recent labs:       Lab Results   Component Value Date/Time    WBC 9.5 10/20/2020 03:00 PM    HEMOGLOBIN 16.7 10/20/2020 03:00 PM    HEMATOCRIT 50.3 (H) 10/20/2020 03:00 PM    MCV 90.8 10/20/2020 03:00 PM       Lab Results   Component Value Date/Time    SODIUM 140 10/20/2020 03:00 PM    POTASSIUM 3.2 (L) 10/20/2020 03:00 PM    CHLORIDE 106 10/20/2020 03:00 PM    CO2 21 10/20/2020 03:00 PM    GLUCOSE 143 (H) 10/20/2020 03:00 PM    BUN 20 (H) 10/20/2020 03:00 PM    CREATININE 1.0 10/20/2020 03:00 PM      Lab Results   Component Value Date/Time    ASTSGOT 24 10/20/2020 03:00 PM    ALTSGPT 18 10/20/2020 03:00 PM    ALBUMIN 3.4 10/20/2020 03:00 PM      Lab Results   Component Value Date/Time    CHOLSTRLTOT 107 (L) 06/28/2019 10:15 AM    LDL 55 06/28/2019 10:15 AM    HDL 34.0 (L) 06/28/2019 10:15 AM    TRIGLYCERIDE 92 06/28/2019 10:15 AM     No results for input(s): NTPROBNP, TROPONINT in the last 72 hours.      Past Medical History:   Diagnosis Date   • Atrial fibrillation (HCC) 12/11/2014    on Eliquis for proximal A Fib   • Bilateral carotid artery stenosis    • Bleeding tendency ([facility])     due to Eliquis   • Chronic low back pain 8/22/2012   • Dental disorder     removable lower front partial   • Depression 8/22/2012   • History of positive PPD 8/22/2012   • Hyperlipidemia 8/22/2012   • Hypertension     on Amlodipine Lisinopril Metoprolol   • Kidney calculi     x 1   • Osteopenia 8/22/2012   • Sleep apnea 08/22/2012    No CPAP since 04/2016  had wt loss of 50 lbs   • Stenosis of right subclavian artery (HCC)    • Stroke ([facility]) 03/2016    mild- had some memory loss   • Vitamin d deficiency 8/22/2012     Past Surgical History:   Procedure Laterality Date   • LIPOMA EXCISION Right 11/21/2017    Procedure: EXCISION LIPOMA RIGHT SHOUDLER/BACK;  Surgeon: Zackary Pacheco M.D.;  Location: SURGERY [facility];  Service: Gen Robotic   • RECOVERY  7/21/2016    Procedure: CATH LAB MISTI WITH ANESTHESIA DR. FOY;  Surgeon: [facility];  Location: SURGERY PRE-POST PROC UNIT [facility];  Service:    • HIP REPLACEMENT, TOTAL Right 02/2016   • CHOLECYSTECTOMY  1991   • APPENDECTOMY  1987   • ABDOMINAL HYSTERECTOMY TOTAL  1962     partial hysterectomy secondary to prolapse   • CATARACT PHACO WITH IOL Left    • HAMMERTOE CORRECTION Left      Family History   Problem Relation Age of Onset   • Other Mother         unknown reasons   • Non-contributory Father 60        MVA, otherwise healthy   • Diabetes Sister    • Other Sister         obesity   • Cancer Sister         Leukemia   • Cancer Brother         brain tumor     Social History     Socioeconomic History   • Marital status:      Spouse name: Not on file   • Number of children: Not on file   • Years of education: Not on file   • Highest education level: Not on file   Occupational History   • Occupation: Retired CNA   Tobacco Use   • Smoking status: Former Smoker     Packs/day: 0.50     Years: 3.00     Pack years: 1.50     Types: Cigarettes     Quit date: 10/20/2014     Years since quittin.5   • Smokeless tobacco: Never Used   Vaping Use   • Vaping Use: Never used   Substance and Sexual Activity   • Alcohol use: Yes     Alcohol/week: 0.0 oz     Comment: rare   • Drug use: No   • Sexual activity: Not on file     Comment:    Other Topics Concern   • Not on file   Social History Narrative     living with .     Social Determinants of Health     Financial Resource Strain:    • Difficulty of Paying Living Expenses:    Food Insecurity:    • Worried About Running Out of Food in the Last Year:    • Ran Out of Food in the Last Year:    Transportation Needs:    • Lack of Transportation (Medical):    • Lack of Transportation (Non-Medical):    Physical Activity:    • Days of Exercise per Week:    • Minutes of Exercise per Session:    Stress:    • Feeling of Stress :    Social Connections:    • Frequency of Communication with Friends and Family:    • Frequency of Social Gatherings with Friends and Family:    • Attends Latter day Services:    • Active Member of Clubs or Organizations:    • Attends Club or Organization Meetings:    • Marital Status:    Intimate Partner  "Violence:    • Fear of Current or Ex-Partner:    • Emotionally Abused:    • Physically Abused:    • Sexually Abused:      Allergies   Allergen Reactions   • Ciprofloxacin Palpitations     Nausea and dizziness   • Oxycodone-Acetaminophen Itching   • Seasonal        Current Outpatient Medications   Medication Sig Dispense Refill   • amoxicillin (AMOXIL) 500 MG Cap      • fluticasone (FLONASE) 50 MCG/ACT nasal spray Administer 1 Spray into affected nostril(S) every day.     • amLODIPine (NORVASC) 5 MG Tab Take 0.5 Tablets by mouth. TAKE ONE TABLET BY MOUTH DAILY 90 tablet 1   • pantoprazole (PROTONIX) 40 MG Tablet Delayed Response TAKE ONE TABLET BY MOUTH DAILY 30 tablet 0   • alendronate (FOSAMAX) 70 MG Tab TAKE 1 TABLET BY MOUTH ONCE WEEKLY ON AN EMPTY STOMACH BEFORE BREAKFAST. REMAIN UPRIGHT FOR 30 MINUTES & TAKE WITH 8 OUNCES OF WATER 12 tablet 1   • lisinopril (PRINIVIL) 40 MG tablet TAKE ONE TABLET BY MOUTH DAILY 90 tablet 1   • simvastatin (ZOCOR) 20 MG Tab TAKE ONE TABLET BY MOUTH EVERY EVENING 90 Tab 2   • ondansetron (ZOFRAN ODT) 4 MG TABLET DISPERSIBLE Take 1 Tab by mouth every 6 hours. 20 Tab 0   • ELIQUIS 5 MG Tab TAKE ONE TABLET BY MOUTH TWICE A  Tab 1   • traZODone (DESYREL) 50 MG Tab TAKE ONE TABLET BY MOUTH AT BEDTIME AS NEEDED FOR SLEEP 30 Tab 4   • Probiotic Product (PROBIOTIC DAILY PO) Take  by mouth.     • B Complex Vitamins (B COMPLEX PO) Take  by mouth.     • Cholecalciferol (D3 ADULT PO) Take  by mouth.     • Omega-3 1400 MG Cap Take  by mouth.     • Multiple Vitamins-Minerals (MULTI ADULT GUMMIES PO) Take 1 Tab by mouth.       No current facility-administered medications for this visit.       ROS  All others systems reviewed and negative.     Objective:     /60 (BP Location: Left arm, Patient Position: Sitting, BP Cuff Size: Adult)   Pulse 88   Ht 1.575 m (5' 2\")   Wt 62.6 kg (138 lb)   SpO2 95%  Body mass index is 25.24 kg/m².    General: No acute distress. Well " nourished.  HEENT: EOM grossly intact, no scleral icterus, no pharyngeal erythema.   Neck:  No JVD, no bruits, trachea midline  CVS: Irreg irreg. Normal S1, S2. No M/R/G. No LE edema.  2+ radial pulses, 2+ PT pulses  Resp: CTAB. No wheezing or crackles/rhonchi. Normal respiratory effort.  Abdomen: Soft, NT, no evy hepatomegaly.  MSK/Ext: No clubbing or cyanosis.  Skin: Warm and dry, no rashes.  Neurological: CN III-XII grossly intact. No focal deficits.   Psych: A&O x 3, appropriate affect, good judgement        Assessment:     1. Syncope and collapse  CBC WITHOUT DIFFERENTIAL    Comp Metabolic Panel    RIH ZIO PATCH MONITOR    CANCELED: TSH WITH REFLEX TO FT4    CANCELED: TSH WITH REFLEX TO FT4   2. Lightheadedness  EKG    CBC WITHOUT DIFFERENTIAL    Comp Metabolic Panel    RIH ZIO PATCH MONITOR    CANCELED: TSH WITH REFLEX TO FT4    CANCELED: TSH WITH REFLEX TO FT4   3. Chronic atrial fibrillation (HCC)  EKG    EC-ECHOCARDIOGRAM COMPLETE W/O CONT    TSH WITH REFLEX TO FT4   4. Anticoagulated  CBC WITHOUT DIFFERENTIAL   5. Palpitations     6. Dyspnea on exertion     7. Cerebrovascular accident (CVA) due to other mechanism (HCC)     8. Benign essential HTN  amLODIPine (NORVASC) 5 MG Tab   9. Bilateral carotid artery stenosis     10. Stage 3a chronic kidney disease (HCC)     11. Nonspecific abnormal electrocardiogram (ECG) (EKG)     12. PVC (premature ventricular contraction)         Medical Decision Making:  Today's Assessment / Status / Plan:     -Syncope and collapse with lightheadedness concerning for drops in blood pressure but the physiology is not clear.  Could be vasovagal, could be tachyarrhythmia, could be a low blood count.  New  -Not taking medications correctly, new  -Abnormal ECG, low voltage, prior abnormal echo, both new  -Needs blood work including electrolytes, CBC, TSH  -Needs updated echocardiogram  -Needs heart monitor, not sure why she is no longer on metoprolol  -Needs close follow-up  -May  need to reduce her amlodipine and allow permissive hypertension  -Her blood pressure today is a little bit on the lower side, reducing amlodipine from 5 to 2.5 mg.  See below.  -Take her Eliquis twice daily, last creatinine was 1, weight is 62.6 kg, age is 80, she is still appropriate for 5 twice daily.  -She has some chronic artery plaque, Dr. Murray said she does not need to come back for 5 years.  She has been getting ultrasounds there.  I requested a copy today of his office visit and his most recent study.  -Also mention of right subclavian artery stenosis but I do not hear significant murmur and symptoms do not seem to correlate with subclavian steal.  -Also has a PVC which she does not appear to feel  -Needs close follow-up, I have requested 4 weeks with myself or Dr. Motta      Written instructions given today:      -It sounds like your lightheadedness is a sudden drop in blood pressure.  That is typically associated with a flushed feeling.    -You are in atrial fibrillation all the time, you were previously on a medication to keep your heart rate from going too fast.  I am not sure why you are no longer taking it.    -Echocardiogram to look at the shape and structure of your heart, it has been 5 years.    -Zio patch heart monitor to evaluate the electrical system of the heart.    -Nonfasting blood work to check your blood count, thyroid and electrolytes, you can go today.    -Reduce your amlodipine from 5 mg to 2.5 mg.    -To protect you from stroke from atrial fibrillation, it is very important to take your blood thinner twice daily.    -We may check carotid arteries down the road but we need to eliminate other causes of lightheadedness first.        Return in about 4 weeks (around 6/15/2021).    It is my pleasure to participate in the care of Ms. Rea.  Please do not hesitate to contact me with questions or concerns.    Susan Camejo MD, [facility]  Cardiologist [facility]  [facility]    Please note that this dictation was created using voice recognition software. I have made every reasonable attempt to correct obvious errors, but it is possible there are errors of grammar and possibly content that I did not discover before finalizing the note.

## 2021-05-18 ENCOUNTER — OFFICE VISIT (OUTPATIENT)
Dept: CARDIOLOGY | Facility: CLINIC | Age: 81
End: 2021-05-18

## 2021-05-18 ENCOUNTER — TELEPHONE (OUTPATIENT)
Dept: CARDIOLOGY | Facility: CLINIC | Age: 81
End: 2021-05-18

## 2021-05-18 ENCOUNTER — NON-PROVIDER VISIT (OUTPATIENT)
Dept: CARDIOLOGY | Facility: CLINIC | Age: 81
End: 2021-05-18

## 2021-05-18 VITALS
OXYGEN SATURATION: 95 % | SYSTOLIC BLOOD PRESSURE: 106 MMHG | WEIGHT: 138 LBS | DIASTOLIC BLOOD PRESSURE: 60 MMHG | HEIGHT: 62 IN | BODY MASS INDEX: 25.4 KG/M2 | HEART RATE: 88 BPM

## 2021-05-18 DIAGNOSIS — I49.3 PVC (PREMATURE VENTRICULAR CONTRACTION): ICD-10-CM

## 2021-05-18 DIAGNOSIS — I65.23 BILATERAL CAROTID ARTERY STENOSIS: ICD-10-CM

## 2021-05-18 DIAGNOSIS — R06.09 DYSPNEA ON EXERTION: ICD-10-CM

## 2021-05-18 DIAGNOSIS — Z79.01 ANTICOAGULATED: ICD-10-CM

## 2021-05-18 DIAGNOSIS — R00.2 PALPITATIONS: ICD-10-CM

## 2021-05-18 DIAGNOSIS — R94.31 NONSPECIFIC ABNORMAL ELECTROCARDIOGRAM (ECG) (EKG): ICD-10-CM

## 2021-05-18 DIAGNOSIS — I48.0 PAF (PAROXYSMAL ATRIAL FIBRILLATION) (HCC): ICD-10-CM

## 2021-05-18 DIAGNOSIS — R42 LIGHTHEADEDNESS: ICD-10-CM

## 2021-05-18 DIAGNOSIS — I63.89 CEREBROVASCULAR ACCIDENT (CVA) DUE TO OTHER MECHANISM (HCC): ICD-10-CM

## 2021-05-18 DIAGNOSIS — N18.31 STAGE 3A CHRONIC KIDNEY DISEASE: ICD-10-CM

## 2021-05-18 DIAGNOSIS — I48.20 CHRONIC ATRIAL FIBRILLATION (HCC): ICD-10-CM

## 2021-05-18 DIAGNOSIS — I10 BENIGN ESSENTIAL HTN: ICD-10-CM

## 2021-05-18 DIAGNOSIS — I49.5 TACHYCARDIA-BRADYCARDIA (HCC): ICD-10-CM

## 2021-05-18 DIAGNOSIS — R55 SYNCOPE AND COLLAPSE: ICD-10-CM

## 2021-05-18 LAB — EKG IMPRESSION: NORMAL

## 2021-05-18 PROCEDURE — 99215 OFFICE O/P EST HI 40 MIN: CPT | Performed by: INTERNAL MEDICINE

## 2021-05-18 PROCEDURE — 93000 ELECTROCARDIOGRAM COMPLETE: CPT | Performed by: INTERNAL MEDICINE

## 2021-05-18 RX ORDER — AMOXICILLIN 500 MG/1
CAPSULE ORAL
COMMUNITY
Start: 2021-03-12 | End: 2022-04-22

## 2021-05-18 RX ORDER — FLUTICASONE PROPIONATE 50 MCG
SPRAY, SUSPENSION (ML) NASAL
COMMUNITY
Start: 2021-05-03 | End: 2021-05-18

## 2021-05-18 RX ORDER — AMLODIPINE BESYLATE 5 MG/1
2.5 TABLET ORAL
Qty: 90 TABLET | Refills: 1 | COMMUNITY
Start: 2021-05-18 | End: 2021-08-03

## 2021-05-18 RX ORDER — FLUTICASONE PROPIONATE 50 MCG
1 SPRAY, SUSPENSION (ML) NASAL DAILY
COMMUNITY
Start: 2021-05-03 | End: 2022-11-01

## 2021-05-18 ASSESSMENT — FIBROSIS 4 INDEX: FIB4 SCORE: 1.58

## 2021-05-18 NOTE — PATIENT INSTRUCTIONS
-It sounds like your lightheadedness is a sudden drop in blood pressure.  That is typically associated with a flushed feeling.    -You are in atrial fibrillation all the time, you were previously on a medication to keep your heart rate from going too fast.  I am not sure why you are no longer taking it.    -Echocardiogram to look at the shape and structure of your heart, it has been 5 years.    -Zio patch heart monitor to evaluate the electrical system of the heart.    -Nonfasting blood work to check your blood count, thyroid and electrolytes, you can go today.    -Reduce your amlodipine from 5 mg to 2.5 mg.    -To protect you from stroke from atrial fibrillation, it is very important to take your blood thinner twice daily.    -We may check carotid arteries down the road but we need to eliminate other causes of lightheadedness first.

## 2021-05-18 NOTE — LETTER
[facility] [address],   [address] [place] [address]  Phone: [phone]  Fax: [phone]              Kaia Rea  1940    Encounter Date: 5/18/2021    Susan Camejo M.D.          PROGRESS NOTE:  Subjective:   Chief Complaint:   Chief Complaint   Patient presents with   • Atrial Fibrillation       Kaia Rea is a 80 y.o. female who returns for chronic afib, anticoagulation.  Today she is here urgently for syncope and lightheadedness.    Sees Dr. Motta, last seen 8-.    Feels a sense of heat coming from legs and climbs up like a hot flash, feels flushed, severe lightheadedness. No nausea.  Has time to sit down most of the time.  Was standing in front of a couch at a neighbors house, fell back on the couch.  Can get dizzy rolling over in bed which sounds like vertigo but it is brief and very different from her lightheadedness.  Nothing associated with use of right upper extremity.    Had her ovaries removed December 2020.    Used to be on furosemide but no longer taking.    Has HTN, 149/60 at home.  Sometimes the bottom number in the 80s.    Has chronic afib.  Prior stroke.  [id number] of 6 with prior stroke.  Only taking Apixaban 5 mg once daily.  Used to be on metoprolol, no longer on her medication list, not sure why.    Also, has some carotid plaque., seeing Dr. Sea Murray, was told she does not need to come back for 5 years.  Also told stenosis of the right subclavian artery but I do not hear a murmur.    CKD 3a.    Last LDL cholesterol in 2019 was 55, HDL low at 34.    She is not limited by chest pain, pressure or tightness with activity.   No significant dyspnea on exertion, orthopnea or lower extremity swelling.   No significant palpitations.  No symptoms of leg claudication.   No stroke/TIA like symptoms.      DATA REVIEWED by me:  ECG (my personal interpretation) 5-18-21  Afib, 103, PVC, low  voltage throughout, baseline wandering    Echo 2016  EF 50 to 55%, possible PFO      Most recent labs:       Lab Results   Component Value Date/Time    WBC 9.5 10/20/2020 03:00 PM    HEMOGLOBIN 16.7 10/20/2020 03:00 PM    HEMATOCRIT 50.3 (H) 10/20/2020 03:00 PM    MCV 90.8 10/20/2020 03:00 PM      Lab Results   Component Value Date/Time    SODIUM 140 10/20/2020 03:00 PM    POTASSIUM 3.2 (L) 10/20/2020 03:00 PM    CHLORIDE 106 10/20/2020 03:00 PM    CO2 21 10/20/2020 03:00 PM    GLUCOSE 143 (H) 10/20/2020 03:00 PM    BUN 20 (H) 10/20/2020 03:00 PM    CREATININE 1.0 10/20/2020 03:00 PM      Lab Results   Component Value Date/Time    ASTSGOT 24 10/20/2020 03:00 PM    ALTSGPT 18 10/20/2020 03:00 PM    ALBUMIN 3.4 10/20/2020 03:00 PM      Lab Results   Component Value Date/Time    CHOLSTRLTOT 107 (L) 06/28/2019 10:15 AM    LDL 55 06/28/2019 10:15 AM    HDL 34.0 (L) 06/28/2019 10:15 AM    TRIGLYCERIDE 92 06/28/2019 10:15 AM     No results for input(s): NTPROBNP, TROPONINT in the last 72 hours.      Past Medical History:   Diagnosis Date   • Atrial fibrillation (HCC) 12/11/2014    on Eliquis for proximal A Fib   • Bilateral carotid artery stenosis    • Bleeding tendency (HCC)     due to Eliquis   • Chronic low back pain 8/22/2012   • Dental disorder     removable lower front partial   • Depression 8/22/2012   • History of positive PPD 8/22/2012   • Hyperlipidemia 8/22/2012   • Hypertension     on Amlodipine Lisinopril Metoprolol   • Kidney calculi     x 1   • Osteopenia 8/22/2012   • Sleep apnea 08/22/2012    No CPAP since 04/2016  had wt loss of 50 lbs   • Stenosis of right subclavian artery (HCC)    • Stroke (HCC) 03/2016    mild- had some memory loss   • Vitamin d deficiency 8/22/2012     Past Surgical History:   Procedure Laterality Date   • LIPOMA EXCISION Right 11/21/2017    Procedure: EXCISION LIPOMA RIGHT SHOUDLER/BACK;  Surgeon: Zackary Pacheco M.D.;  Location: SURGERY [facility];  Service: Gen Robotic   •  RECOVERY  2016    Procedure: CATH LAB MISTI WITH ANESTHESIA DR. FOY;  Surgeon: Ismael Surgery;  Location: SURGERY PRE-POST PROC UNIT [facility];  Service:    • HIP REPLACEMENT, TOTAL Right 2016   • CHOLECYSTECTOMY     • APPENDECTOMY     • ABDOMINAL HYSTERECTOMY TOTAL      partial hysterectomy secondary to prolapse   • CATARACT PHACO WITH IOL Left    • HAMMERTOE CORRECTION Left      Family History   Problem Relation Age of Onset   • Other Mother         unknown reasons   • Non-contributory Father 60        MVA, otherwise healthy   • Diabetes Sister    • Other Sister         obesity   • Cancer Sister         Leukemia   • Cancer Brother         brain tumor     Social History     Socioeconomic History   • Marital status:      Spouse name: Not on file   • Number of children: Not on file   • Years of education: Not on file   • Highest education level: Not on file   Occupational History   • Occupation: Retired CNA   Tobacco Use   • Smoking status: Former Smoker     Packs/day: 0.50     Years: 3.00     Pack years: 1.50     Types: Cigarettes     Quit date: 10/20/2014     Years since quittin.5   • Smokeless tobacco: Never Used   Vaping Use   • Vaping Use: Never used   Substance and Sexual Activity   • Alcohol use: Yes     Alcohol/week: 0.0 oz     Comment: rare   • Drug use: No   • Sexual activity: Not on file     Comment:    Other Topics Concern   • Not on file   Social History Narrative     living with .     Social Determinants of Health     Financial Resource Strain:    • Difficulty of Paying Living Expenses:    Food Insecurity:    • Worried About Running Out of Food in the Last Year:    • Ran Out of Food in the Last Year:    Transportation Needs:    • Lack of Transportation (Medical):    • Lack of Transportation (Non-Medical):    Physical Activity:    • Days of Exercise per Week:    • Minutes of Exercise per Session:    Stress:    • Feeling of Stress :    Social  Connections:    • Frequency of Communication with Friends and Family:    • Frequency of Social Gatherings with Friends and Family:    • Attends Orthodoxy Services:    • Active Member of Clubs or Organizations:    • Attends Club or Organization Meetings:    • Marital Status:    Intimate Partner Violence:    • Fear of Current or Ex-Partner:    • Emotionally Abused:    • Physically Abused:    • Sexually Abused:      Allergies   Allergen Reactions   • Ciprofloxacin Palpitations     Nausea and dizziness   • Oxycodone-Acetaminophen Itching   • Seasonal        Current Outpatient Medications   Medication Sig Dispense Refill   • amoxicillin (AMOXIL) 500 MG Cap      • fluticasone (FLONASE) 50 MCG/ACT nasal spray Administer 1 Spray into affected nostril(S) every day.     • amLODIPine (NORVASC) 5 MG Tab Take 0.5 Tablets by mouth. TAKE ONE TABLET BY MOUTH DAILY 90 tablet 1   • pantoprazole (PROTONIX) 40 MG Tablet Delayed Response TAKE ONE TABLET BY MOUTH DAILY 30 tablet 0   • alendronate (FOSAMAX) 70 MG Tab TAKE 1 TABLET BY MOUTH ONCE WEEKLY ON AN EMPTY STOMACH BEFORE BREAKFAST. REMAIN UPRIGHT FOR 30 MINUTES & TAKE WITH 8 OUNCES OF WATER 12 tablet 1   • lisinopril (PRINIVIL) 40 MG tablet TAKE ONE TABLET BY MOUTH DAILY 90 tablet 1   • simvastatin (ZOCOR) 20 MG Tab TAKE ONE TABLET BY MOUTH EVERY EVENING 90 Tab 2   • ondansetron (ZOFRAN ODT) 4 MG TABLET DISPERSIBLE Take 1 Tab by mouth every 6 hours. 20 Tab 0   • ELIQUIS 5 MG Tab TAKE ONE TABLET BY MOUTH TWICE A  Tab 1   • traZODone (DESYREL) 50 MG Tab TAKE ONE TABLET BY MOUTH AT BEDTIME AS NEEDED FOR SLEEP 30 Tab 4   • Probiotic Product (PROBIOTIC DAILY PO) Take  by mouth.     • B Complex Vitamins (B COMPLEX PO) Take  by mouth.     • Cholecalciferol (D3 ADULT PO) Take  by mouth.     • Omega-3 1400 MG Cap Take  by mouth.     • Multiple Vitamins-Minerals (MULTI ADULT GUMMIES PO) Take 1 Tab by mouth.       No current facility-administered medications for this visit.  "      ROS  All others systems reviewed and negative.     Objective:     /60 (BP Location: Left arm, Patient Position: Sitting, BP Cuff Size: Adult)   Pulse 88   Ht 1.575 m (5' 2\")   Wt 62.6 kg (138 lb)   SpO2 95%  Body mass index is 25.24 kg/m².    General: No acute distress. Well nourished.  HEENT: EOM grossly intact, no scleral icterus, no pharyngeal erythema.   Neck:  No JVD, no bruits, trachea midline  CVS: Irreg irreg. Normal S1, S2. No M/R/G. No LE edema.  2+ radial pulses, 2+ PT pulses  Resp: CTAB. No wheezing or crackles/rhonchi. Normal respiratory effort.  Abdomen: Soft, NT, no evy hepatomegaly.  MSK/Ext: No clubbing or cyanosis.  Skin: Warm and dry, no rashes.  Neurological: CN III-XII grossly intact. No focal deficits.   Psych: A&O x 3, appropriate affect, good judgement        Assessment:     1. Syncope and collapse  CBC WITHOUT DIFFERENTIAL    TSH WITH REFLEX TO FT4    Comp Metabolic Panel    RIH ZIO PATCH MONITOR   2. Lightheadedness  EKG    CBC WITHOUT DIFFERENTIAL    TSH WITH REFLEX TO FT4    Comp Metabolic Panel    RIH ZIO PATCH MONITOR   3. Chronic atrial fibrillation (HCC)  EKG    EC-ECHOCARDIOGRAM COMPLETE W/O CONT   4. Anticoagulated  CBC WITHOUT DIFFERENTIAL   5. Palpitations     6. Dyspnea on exertion     7. Cerebrovascular accident (CVA) due to other mechanism (HCC)     8. Benign essential HTN  amLODIPine (NORVASC) 5 MG Tab   9. Bilateral carotid artery stenosis     10. Stage 3a chronic kidney disease (HCC)     11. Nonspecific abnormal electrocardiogram (ECG) (EKG)     12. PVC (premature ventricular contraction)         Medical Decision Making:  Today's Assessment / Status / Plan:     -Syncope and collapse with lightheadedness concerning for drops in blood pressure but the physiology is not clear.  Could be vasovagal, could be tachyarrhythmia, could be a low blood count.  New  -Not taking medications correctly, new  -Abnormal ECG, low voltage, prior abnormal echo, both new  -Needs " blood work including electrolytes, CBC, TSH  -Needs updated echocardiogram  -Needs heart monitor, not sure why she is no longer on metoprolol  -Needs close follow-up  -May need to reduce her amlodipine and allow permissive hypertension  -Her blood pressure today is a little bit on the lower side, reducing amlodipine from 5 to 2.5 mg.  See below.  -Take her Eliquis twice daily, last creatinine was 1, weight is 62.6 kg, age is 80, she is still appropriate for 5 twice daily.  -She has some chronic artery plaque, Dr. Murray said she does not need to come back for 5 years.  She has been getting ultrasounds there.  I requested a copy today of his office visit and his most recent study.  -Also mention of right subclavian artery stenosis but I do not hear significant murmur and symptoms do not seem to correlate with subclavian steal.  -Also has a PVC which she does not appear to feel  -Needs close follow-up, I have requested 4 weeks with myself or Dr. Motta      Written instructions given today:      -It sounds like your lightheadedness is a sudden drop in blood pressure.  That is typically associated with a flushed feeling.    -You are in atrial fibrillation all the time, you were previously on a medication to keep your heart rate from going too fast.  I am not sure why you are no longer taking it.    -Echocardiogram to look at the shape and structure of your heart, it has been 5 years.    -Zio patch heart monitor to evaluate the electrical system of the heart.    -Nonfasting blood work to check your blood count, thyroid and electrolytes, you can go today.    -Reduce your amlodipine from 5 mg to 2.5 mg.    -To protect you from stroke from atrial fibrillation, it is very important to take your blood thinner twice daily.    -We may check carotid arteries down the road but we need to eliminate other causes of lightheadedness first.        Return in about 4 weeks (around 6/15/2021).    It is my pleasure to participate in the  care of Ms. Rea.  Please do not hesitate to contact me with questions or concerns.    Susan Camejo MD, [facility]  Cardiologist [facility]    Please note that this dictation was created using voice recognition software. I have made every reasonable attempt to correct obvious errors, but it is possible there are errors of grammar and possibly content that I did not discover before finalizing the note.        Patrice Motta M.D.  [address]  [address]  [facility] [id number]  Via In Basket

## 2021-05-18 NOTE — LETTER
[facility] HealthRe[facility] [address],   2nd [place] [address]  Phone: [phone]  Fax: [phone]              Kaia Rae  1940    Encounter Date: 5/18/2021    Susan Camejo M.D.          PROGRESS NOTE:  Subjective:   Chief Complaint:   Chief Complaint   Patient presents with   • Atrial Fibrillation       Kaia Rea is a 80 y.o. female who returns for chronic afib, anticoagulation.  Today she is here urgently for syncope and lightheadedness.    Sees Dr. Motta, last seen 8-.    Feels a sense of heat coming from legs and climbs up like a hot flash, feels flushed, severe lightheadedness. No nausea.  Has time to sit down most of the time.  Was standing in front of a couch at a neighbors house, fell back on the couch.  Can get dizzy rolling over in bed which sounds like vertigo but it is brief and very different from her lightheadedness.  Nothing associated with use of right upper extremity.    Had her ovaries removed December 2020.    Used to be on furosemide but no longer taking.    Has HTN, 149/60 at home.  Sometimes the bottom number in the 80s.    Has chronic afib.  Prior stroke.  [id number] of 6 with prior stroke.  Only taking Apixaban 5 mg once daily.  Used to be on metoprolol, no longer on her medication list, not sure why.    Also, has some carotid plaque., seeing Dr. Sea Murray, was told she does not need to come back for 5 years.  Also told stenosis of the right subclavian artery but I do not hear a murmur.    CKD 3a.    Last LDL cholesterol in 2019 was 55, HDL low at 34.    She is not limited by chest pain, pressure or tightness with activity.   No significant dyspnea on exertion, orthopnea or lower extremity swelling.   No significant palpitations.  No symptoms of leg claudication.   No stroke/TIA like symptoms.      DATA REVIEWED by me:  ECG (my personal interpretation) 5-18-21  Afib, 103, PVC, low  voltage throughout, baseline wandering    Echo 2016  EF 50 to 55%, possible PFO      Most recent labs:       Lab Results   Component Value Date/Time    WBC 9.5 10/20/2020 03:00 PM    HEMOGLOBIN 16.7 10/20/2020 03:00 PM    HEMATOCRIT 50.3 (H) 10/20/2020 03:00 PM    MCV 90.8 10/20/2020 03:00 PM      Lab Results   Component Value Date/Time    SODIUM 140 10/20/2020 03:00 PM    POTASSIUM 3.2 (L) 10/20/2020 03:00 PM    CHLORIDE 106 10/20/2020 03:00 PM    CO2 21 10/20/2020 03:00 PM    GLUCOSE 143 (H) 10/20/2020 03:00 PM    BUN 20 (H) 10/20/2020 03:00 PM    CREATININE 1.0 10/20/2020 03:00 PM      Lab Results   Component Value Date/Time    ASTSGOT 24 10/20/2020 03:00 PM    ALTSGPT 18 10/20/2020 03:00 PM    ALBUMIN 3.4 10/20/2020 03:00 PM      Lab Results   Component Value Date/Time    CHOLSTRLTOT 107 (L) 06/28/2019 10:15 AM    LDL 55 06/28/2019 10:15 AM    HDL 34.0 (L) 06/28/2019 10:15 AM    TRIGLYCERIDE 92 06/28/2019 10:15 AM     No results for input(s): NTPROBNP, TROPONINT in the last 72 hours.      Past Medical History:   Diagnosis Date   • Atrial fibrillation (HCC) 12/11/2014    on Eliquis for proximal A Fib   • Bilateral carotid artery stenosis    • Bleeding tendency (HCC)     due to Eliquis   • Chronic low back pain 8/22/2012   • Dental disorder     removable lower front partial   • Depression 8/22/2012   • History of positive PPD 8/22/2012   • Hyperlipidemia 8/22/2012   • Hypertension     on Amlodipine Lisinopril Metoprolol   • Kidney calculi     x 1   • Osteopenia 8/22/2012   • Sleep apnea 08/22/2012    No CPAP since 04/2016  had wt loss of 50 lbs   • Stenosis of right subclavian artery (HCC)    • Stroke (HCC) 03/2016    mild- had some memory loss   • Vitamin d deficiency 8/22/2012     Past Surgical History:   Procedure Laterality Date   • LIPOMA EXCISION Right 11/21/2017    Procedure: EXCISION LIPOMA RIGHT SHOUDLER/BACK;  Surgeon: Zackary Pacheco M.D.;  Location: SURGERY [facility];  Service: Gen Robotic   •  RECOVERY  2016    Procedure: CATH LAB MISTI WITH ANESTHESIA DR. FOY;  Surgeon: Ismael Surgery;  Location: SURGERY PRE-POST PROC UNIT [facility];  Service:    • HIP REPLACEMENT, TOTAL Right 2016   • CHOLECYSTECTOMY     • APPENDECTOMY     • ABDOMINAL HYSTERECTOMY TOTAL      partial hysterectomy secondary to prolapse   • CATARACT PHACO WITH IOL Left    • HAMMERTOE CORRECTION Left      Family History   Problem Relation Age of Onset   • Other Mother         unknown reasons   • Non-contributory Father 60        MVA, otherwise healthy   • Diabetes Sister    • Other Sister         obesity   • Cancer Sister         Leukemia   • Cancer Brother         brain tumor     Social History     Socioeconomic History   • Marital status:      Spouse name: Not on file   • Number of children: Not on file   • Years of education: Not on file   • Highest education level: Not on file   Occupational History   • Occupation: Retired CNA   Tobacco Use   • Smoking status: Former Smoker     Packs/day: 0.50     Years: 3.00     Pack years: 1.50     Types: Cigarettes     Quit date: 10/20/2014     Years since quittin.5   • Smokeless tobacco: Never Used   Vaping Use   • Vaping Use: Never used   Substance and Sexual Activity   • Alcohol use: Yes     Alcohol/week: 0.0 oz     Comment: rare   • Drug use: No   • Sexual activity: Not on file     Comment:    Other Topics Concern   • Not on file   Social History Narrative     living with .     Social Determinants of Health     Financial Resource Strain:    • Difficulty of Paying Living Expenses:    Food Insecurity:    • Worried About Running Out of Food in the Last Year:    • Ran Out of Food in the Last Year:    Transportation Needs:    • Lack of Transportation (Medical):    • Lack of Transportation (Non-Medical):    Physical Activity:    • Days of Exercise per Week:    • Minutes of Exercise per Session:    Stress:    • Feeling of Stress :    Social  Connections:    • Frequency of Communication with Friends and Family:    • Frequency of Social Gatherings with Friends and Family:    • Attends Restoration Services:    • Active Member of Clubs or Organizations:    • Attends Club or Organization Meetings:    • Marital Status:    Intimate Partner Violence:    • Fear of Current or Ex-Partner:    • Emotionally Abused:    • Physically Abused:    • Sexually Abused:      Allergies   Allergen Reactions   • Ciprofloxacin Palpitations     Nausea and dizziness   • Oxycodone-Acetaminophen Itching   • Seasonal        Current Outpatient Medications   Medication Sig Dispense Refill   • amoxicillin (AMOXIL) 500 MG Cap      • fluticasone (FLONASE) 50 MCG/ACT nasal spray Administer 1 Spray into affected nostril(S) every day.     • amLODIPine (NORVASC) 5 MG Tab Take 0.5 Tablets by mouth. TAKE ONE TABLET BY MOUTH DAILY 90 tablet 1   • pantoprazole (PROTONIX) 40 MG Tablet Delayed Response TAKE ONE TABLET BY MOUTH DAILY 30 tablet 0   • alendronate (FOSAMAX) 70 MG Tab TAKE 1 TABLET BY MOUTH ONCE WEEKLY ON AN EMPTY STOMACH BEFORE BREAKFAST. REMAIN UPRIGHT FOR 30 MINUTES & TAKE WITH 8 OUNCES OF WATER 12 tablet 1   • lisinopril (PRINIVIL) 40 MG tablet TAKE ONE TABLET BY MOUTH DAILY 90 tablet 1   • simvastatin (ZOCOR) 20 MG Tab TAKE ONE TABLET BY MOUTH EVERY EVENING 90 Tab 2   • ondansetron (ZOFRAN ODT) 4 MG TABLET DISPERSIBLE Take 1 Tab by mouth every 6 hours. 20 Tab 0   • ELIQUIS 5 MG Tab TAKE ONE TABLET BY MOUTH TWICE A  Tab 1   • traZODone (DESYREL) 50 MG Tab TAKE ONE TABLET BY MOUTH AT BEDTIME AS NEEDED FOR SLEEP 30 Tab 4   • Probiotic Product (PROBIOTIC DAILY PO) Take  by mouth.     • B Complex Vitamins (B COMPLEX PO) Take  by mouth.     • Cholecalciferol (D3 ADULT PO) Take  by mouth.     • Omega-3 1400 MG Cap Take  by mouth.     • Multiple Vitamins-Minerals (MULTI ADULT GUMMIES PO) Take 1 Tab by mouth.       No current facility-administered medications for this visit.  "      ROS  All others systems reviewed and negative.     Objective:     /60 (BP Location: Left arm, Patient Position: Sitting, BP Cuff Size: Adult)   Pulse 88   Ht 1.575 m (5' 2\")   Wt 62.6 kg (138 lb)   SpO2 95%  Body mass index is 25.24 kg/m².    General: No acute distress. Well nourished.  HEENT: EOM grossly intact, no scleral icterus, no pharyngeal erythema.   Neck:  No JVD, no bruits, trachea midline  CVS: Irreg irreg. Normal S1, S2. No M/R/G. No LE edema.  2+ radial pulses, 2+ PT pulses  Resp: CTAB. No wheezing or crackles/rhonchi. Normal respiratory effort.  Abdomen: Soft, NT, no evy hepatomegaly.  MSK/Ext: No clubbing or cyanosis.  Skin: Warm and dry, no rashes.  Neurological: CN III-XII grossly intact. No focal deficits.   Psych: A&O x 3, appropriate affect, good judgement        Assessment:     1. Syncope and collapse  CBC WITHOUT DIFFERENTIAL    TSH WITH REFLEX TO FT4    Comp Metabolic Panel    RIH ZIO PATCH MONITOR   2. Lightheadedness  EKG    CBC WITHOUT DIFFERENTIAL    TSH WITH REFLEX TO FT4    Comp Metabolic Panel    RIH ZIO PATCH MONITOR   3. Chronic atrial fibrillation (HCC)  EKG    EC-ECHOCARDIOGRAM COMPLETE W/O CONT   4. Anticoagulated  CBC WITHOUT DIFFERENTIAL   5. Palpitations     6. Dyspnea on exertion     7. Cerebrovascular accident (CVA) due to other mechanism (HCC)     8. Benign essential HTN  amLODIPine (NORVASC) 5 MG Tab   9. Bilateral carotid artery stenosis     10. Stage 3a chronic kidney disease (HCC)     11. Nonspecific abnormal electrocardiogram (ECG) (EKG)     12. PVC (premature ventricular contraction)         Medical Decision Making:  Today's Assessment / Status / Plan:     -Syncope and collapse with lightheadedness concerning for drops in blood pressure but the physiology is not clear.  Could be vasovagal, could be tachyarrhythmia, could be a low blood count.  New  -Not taking medications correctly, new  -Abnormal ECG, low voltage, prior abnormal echo, both new  -Needs " blood work including electrolytes, CBC, TSH  -Needs updated echocardiogram  -Needs heart monitor, not sure why she is no longer on metoprolol  -Needs close follow-up  -May need to reduce her amlodipine and allow permissive hypertension  -Her blood pressure today is a little bit on the lower side, reducing amlodipine from 5 to 2.5 mg.  See below.  -Take her Eliquis twice daily, last creatinine was 1, weight is 62.6 kg, age is 80, she is still appropriate for 5 twice daily.  -She has some chronic artery plaque, Dr. Murray said she does not need to come back for 5 years.  She has been getting ultrasounds there.  I requested a copy today of his office visit and his most recent study.  -Also mention of right subclavian artery stenosis but I do not hear significant murmur and symptoms do not seem to correlate with subclavian steal.  -Also has a PVC which she does not appear to feel  -Needs close follow-up, I have requested 4 weeks with myself or Dr. Motta      Written instructions given today:      -It sounds like your lightheadedness is a sudden drop in blood pressure.  That is typically associated with a flushed feeling.    -You are in atrial fibrillation all the time, you were previously on a medication to keep your heart rate from going too fast.  I am not sure why you are no longer taking it.    -Echocardiogram to look at the shape and structure of your heart, it has been 5 years.    -Zio patch heart monitor to evaluate the electrical system of the heart.    -Nonfasting blood work to check your blood count, thyroid and electrolytes, you can go today.    -Reduce your amlodipine from 5 mg to 2.5 mg.    -To protect you from stroke from atrial fibrillation, it is very important to take your blood thinner twice daily.    -We may check carotid arteries down the road but we need to eliminate other causes of lightheadedness first.        Return in about 4 weeks (around 6/15/2021).    It is my pleasure to participate in the  care of Ms. Rea.  Please do not hesitate to contact me with questions or concerns.    Susan Camejo MD, [facility]  Cardiologist [facility]    Please note that this dictation was created using voice recognition software. I have made every reasonable attempt to correct obvious errors, but it is possible there are errors of grammar and possibly content that I did not discover before finalizing the note.        Lizzeth Baca, AMRIT.P.R.N.  [address] [address]  Via In Basket

## 2021-06-04 ENCOUNTER — TELEPHONE (OUTPATIENT)
Dept: CARDIOLOGY | Facility: MEDICAL CENTER | Age: 81
End: 2021-06-04

## 2021-06-04 DIAGNOSIS — I48.20 CHRONIC ATRIAL FIBRILLATION (HCC): ICD-10-CM

## 2021-06-04 NOTE — TELEPHONE ENCOUNTER
Returned call. Elisha reports pt had a 6.4 second pause on 5/22 around 8:58 pm, pt was symptomatic but did not report symptoms. She does have hx of syncope and lightheadedness. Full report is not uploaded yet.     CW, please advise as MARY is off post call today     DEYSI HERNANDEZ

## 2021-06-04 NOTE — TELEPHONE ENCOUNTER
Received tracing from Corn[facility].  Upon chart review it was scanned into chart.    Called [facility] and spoke to RN who transferred me to the Cardiologist.  He stated he is going to get her scheduled tomorrow morning for a pacemaker and hold her BB.  I advised I have the zio report and it is uploaded into media and asked for fax number to send it as well. He stated she is in SR right now. He asked for CW phone number to discuss the case, I notified CW MD that he may be getting a call.  Answered all questions and concerns.     Faxed report to [facility], received fax confirmation.

## 2021-06-04 NOTE — TELEPHONE ENCOUNTER
Received call from Zahida donnelly who had Dr. Felix ANDRADE on the phone from ER of [facility].      Call was sent through I could hear talking and kept saying hello, but no answer and then they hung up.      Spoke to Kaylah and she reviewed chart, nothing scanned in.  She advised to speak to [employer] for tracings.    Went and spoke to [employer] and she found the zio report and I asked to have it scanned into media and she stated she would.

## 2021-06-04 NOTE — TELEPHONE ENCOUNTER
"Called and spoke to the patient relayed all information from monitor and [facility].  She then stated I have [facility].  I advised I am going to find out what our process is at this point and then I will let her know. She asked if she can have the procedure at the [facility] In the meantime if she has any symptoms at all she needs to call 911 and go to the ER.  She verbalized understanding and was appreciative of call.    Hospital in [facility].     Spoke to Ailyn CORNEJO And she stated she recevied a message from [facility] COATES and I advised patient has [facility]. She stated let me check and see if [facility] does pacemakers and she will call me back.  She called back and stated Dr. Krishnamurthy said they do pacemakers there.     Called and spoke to the patient. Advised with her insurance we cannot schedule her to have a pacemaker, so she needs to go to the ER.  I advised she needs to go to the ER and they will transfer here to whichever hospital takes her insurance and provides the services, she verbalized understanding.  She then stated I can \"come to my appointment on Monday and then walk downstairs and check myself into the ER and get the pacemaker here\".  I advised she cannot have the pacemaker or appointment here due to her insurance.  I stated I have to cancel her appt on Monday with [facility] or she can sign an out of network form and she may be responsible for the services.  She stated she does not have money to pay for that.  I asked if she has someone to take her to the ER and she stated \"I can probably find someone\", I stated if she cannot in the next few minutes she needs to call 911 and get to the hospital.  She verbalized understanding and was appreciative of call.     Appt cancelled.   "

## 2021-06-04 NOTE — TELEPHONE ENCOUNTER
Spoke with Dr Blancas pt at [facility] plan for PPM tomorrow     Gabo showed pAF with conversion pauses but often was back in afib post conversion, she is now in sinus    He had a medication list that included metoprolol but in May follow-up it was not on her list so not sure if she is entirely taken in any case she would certainly benefit for pacemaker so that she could safely tolerate metoprolol for tachybradycardia syndrome

## 2021-06-04 NOTE — TELEPHONE ENCOUNTER
MARY Fallon with [employer] called with abnormal reading. Attempted to call nurse x 2. Please call back [phone] ref [phone].    Thank you,    Zahida MATOS

## 2021-06-04 NOTE — TELEPHONE ENCOUNTER
She will need a pacemaker ASAP hopefully by Tuesday, in the interim low threshold to come to ER for transfer up to [facility]

## 2021-06-07 ENCOUNTER — APPOINTMENT (OUTPATIENT)
Dept: CARDIOLOGY | Facility: CLINIC | Age: 81
End: 2021-06-07
Payer: COMMERCIAL

## 2021-06-08 ENCOUNTER — TELEPHONE (OUTPATIENT)
Dept: CARDIOLOGY | Facility: MEDICAL CENTER | Age: 81
End: 2021-06-08

## 2021-06-08 PROCEDURE — 93246 EXT ECG>7D<15D RECORDING: CPT | Performed by: INTERNAL MEDICINE

## 2021-06-08 PROCEDURE — 93248 EXT ECG>7D<15D REV&INTERPJ: CPT | Performed by: INTERNAL MEDICINE

## 2021-06-08 NOTE — TELEPHONE ENCOUNTER
Letter printed with both MD recommendations and mailed to pt mailing address listed in chart.

## 2021-06-08 NOTE — LETTER
June 8, 2021        Kaia Rea  [address] [place] [address]          Dear Kaia,    We have received the results of your recent: Echocardiogram and Holter Monitor     Your tests came back and Dr. Camejo reviewed your Echocardiogram Results who states you echo looks really good.     Dr. Motta reviewed your holter monitor results and states you already had a pacemaker placed, not sure if you are able to see us long term due to insurance, hopefully so.     Please follow up as previously discussed with your physician.      Feel free to call us with any questions.        Sincerely,          Chantale Motta  Electronically Signed

## 2021-06-08 NOTE — TELEPHONE ENCOUNTER
----- Message from Patirce Motta M.D. sent at 6/8/2021 12:25 AM PDT -----  Patient already had pacemaker, not sure if she will be able to see this long-term due to insurance hopefully so

## 2022-03-14 DIAGNOSIS — E78.5 DYSLIPIDEMIA: ICD-10-CM

## 2022-03-15 RX ORDER — SIMVASTATIN 20 MG
20 TABLET ORAL EVERY EVENING
Qty: 90 TABLET | Refills: 1 | Status: SHIPPED | OUTPATIENT
Start: 2022-03-15 | End: 2022-04-22 | Stop reason: SDUPTHER

## 2022-04-22 PROBLEM — I49.5 TACHY-BRADY SYNDROME (HCC): Status: ACTIVE | Noted: 2021-06-04

## 2022-04-22 PROBLEM — E78.49 OTHER HYPERLIPIDEMIA: Status: ACTIVE | Noted: 2021-06-04

## 2022-04-22 PROBLEM — Z86.73 H/O: STROKE: Status: ACTIVE | Noted: 2021-06-04

## 2022-04-22 PROBLEM — I45.5 SINUS PAUSE: Status: ACTIVE | Noted: 2021-06-04

## 2022-04-22 PROBLEM — Z95.0 S/P PLACEMENT OF CARDIAC PACEMAKER: Status: ACTIVE | Noted: 2021-06-06

## 2022-04-27 ENCOUNTER — TELEPHONE (OUTPATIENT)
Dept: CARDIOLOGY | Facility: MEDICAL CENTER | Age: 82
End: 2022-04-27
Payer: COMMERCIAL

## 2022-04-27 NOTE — TELEPHONE ENCOUNTER
----- Message from Chantale Holland R.N. sent at 4/25/2022  5:07 PM PDT -----  Hi Brenna,     Please send a letter to the patient regarding CW  recommendations.    Thank you!    ----- Message -----  From: Patrice Motta M.D.  Sent: 4/25/2022   2:29 PM PDT  To: Chantale Holland R.N.    Labs look good, please let her know     Thank you

## 2022-04-27 NOTE — LETTER
April 27, 2022        Kaia Rea  [address]  [place] [address]          Dear Kaia,    We have received the results of your recent:    Lab Work    Your test came back within normal limits.  Please follow up as previously discussed with your physician.      Feel free to call us with any questions.        Sincerely,          Brenna Medical Assistant for Dr. Motta  Electronically Signed  
36.3

## 2022-08-31 PROBLEM — I25.10 CORONARY ARTERY ARTERIOSCLEROSIS: Status: ACTIVE | Noted: 2022-08-31

## 2022-09-01 PROBLEM — I50.9 ACUTE ON CHRONIC CONGESTIVE HEART FAILURE (HCC): Status: ACTIVE | Noted: 2022-09-01

## 2022-09-01 PROBLEM — I21.4 NON-ST ELEVATION MYOCARDIAL INFARCTION (NSTEMI) (HCC): Status: ACTIVE | Noted: 2022-09-01

## 2022-09-01 PROBLEM — Z09 HOSPITAL DISCHARGE FOLLOW-UP: Status: ACTIVE | Noted: 2022-09-01

## 2022-11-07 PROBLEM — Z91.81 AT MODERATE RISK FOR FALL: Status: ACTIVE | Noted: 2022-11-07
